# Patient Record
Sex: MALE | Race: WHITE | Employment: FULL TIME | URBAN - METROPOLITAN AREA
[De-identification: names, ages, dates, MRNs, and addresses within clinical notes are randomized per-mention and may not be internally consistent; named-entity substitution may affect disease eponyms.]

---

## 2021-05-25 ENCOUNTER — OFFICE VISIT (OUTPATIENT)
Dept: ORTHOPEDIC SURGERY | Age: 66
End: 2021-05-25
Payer: COMMERCIAL

## 2021-05-25 VITALS
BODY MASS INDEX: 40.09 KG/M2 | WEIGHT: 280 LBS | DIASTOLIC BLOOD PRESSURE: 61 MMHG | SYSTOLIC BLOOD PRESSURE: 98 MMHG | HEART RATE: 68 BPM | HEIGHT: 70 IN

## 2021-05-25 DIAGNOSIS — M25.551 RIGHT HIP PAIN: Primary | ICD-10-CM

## 2021-05-25 PROCEDURE — 99203 OFFICE O/P NEW LOW 30 MIN: CPT | Performed by: ORTHOPAEDIC SURGERY

## 2021-05-25 RX ORDER — ACYCLOVIR 200 MG/1
200 CAPSULE ORAL SEE ADMIN INSTRUCTIONS
COMMUNITY
Start: 2021-05-19

## 2021-05-25 RX ORDER — ATORVASTATIN CALCIUM 80 MG/1
80 TABLET, FILM COATED ORAL DAILY
COMMUNITY
Start: 2021-05-19

## 2021-05-25 RX ORDER — LISINOPRIL AND HYDROCHLOROTHIAZIDE 20; 12.5 MG/1; MG/1
1 TABLET ORAL DAILY
COMMUNITY
Start: 2021-05-19

## 2021-05-25 RX ORDER — VITAMIN A, VITAMIN C, VITAMIN D-3, VITAMIN E, VITAMIN B-1, VITAMIN B-2, NIACIN, VITAMIN B-6, CALCIUM, IRON, ZINC, COPPER 4000; 120; 400; 22; 1.84; 3; 20; 10; 1; 12; 200; 27; 25; 2 [IU]/1; MG/1; [IU]/1; MG/1; MG/1; MG/1; MG/1; MG/1; MG/1; UG/1; MG/1; MG/1; MG/1; MG/1
1 TABLET ORAL 2 TIMES DAILY
Qty: 60 TABLET | Refills: 0 | Status: SHIPPED | OUTPATIENT
Start: 2021-05-25 | End: 2021-06-22

## 2021-05-25 RX ORDER — MELOXICAM 15 MG/1
15 TABLET ORAL DAILY
COMMUNITY
Start: 2021-05-19

## 2021-05-25 NOTE — PROGRESS NOTES
Patient Name: Rayburn Severs  : 1955  DOS: 2021        Chief Complaint:   Chief Complaint   Patient presents with    Hip Pain     RIGHT HIP-NO RECENT INJURY, ONGOING PAIN       History of Present Illness:  Rayburn Severs is a 73 y.o. male who presents with a chief complaint of  Right hip pain. Bilateral total knee arthroplasty doing well. Right hip with conitnued irritation and progressive difficulty with movemnet and use. Medical History  Current Medications:   Prior to Admission medications    Medication Sig Start Date End Date Taking? Authorizing Provider   acyclovir (ZOVIRAX) 200 MG capsule Take 200 mg by mouth See Admin Instructions 21  Yes Historical Provider, MD   atorvastatin (LIPITOR) 80 MG tablet Take 80 mg by mouth daily 21  Yes Historical Provider, MD   lisinopril-hydroCHLOROthiazide (PRINZIDE;ZESTORETIC) 20-12.5 MG per tablet Take 1 tablet by mouth daily 21  Yes Historical Provider, MD   meloxicam (MOBIC) 15 MG tablet Take 15 mg by mouth daily 21  Yes Historical Provider, MD   Multiple Vitamins-Minerals (MULTIVITAMIN ADULTS PO) Take 1 tablet by mouth daily    Historical Provider, MD   Aspirin Buf,CaCarb-MgCarb-MgO, 81 MG TABS Take 81 mg by mouth daily    Historical Provider, MD     Allergies: No Known Allergies  No past medical history on file.   Social History     Socioeconomic History    Marital status:      Spouse name: Not on file    Number of children: Not on file    Years of education: Not on file    Highest education level: Not on file   Occupational History    Not on file   Tobacco Use    Smoking status: Former Smoker     Types: Cigarettes    Smokeless tobacco: Former User   Substance and Sexual Activity    Alcohol use: Not on file    Drug use: Not on file    Sexual activity: Not on file   Other Topics Concern    Not on file   Social History Narrative    Not on file     Social Determinants of Health     Financial Resource Strain:     Difficulty of Paying Living Expenses:    Food Insecurity:     Worried About Running Out of Food in the Last Year:     [address] in the Last Year:    Transportation Needs:     Lack of Transportation (Medical):  Lack of Transportation (Non-Medical):    Physical Activity:     Days of Exercise per Week:     Minutes of Exercise per Session:    Stress:     Feeling of Stress :    Social Connections:     Frequency of Communication with Friends and Family:     Frequency of Social Gatherings with Friends and Family:     Attends [employer] Services:     Active Member of Clubs or Organizations:     Attends Club or Organization Meetings:     Marital Status:    Intimate Partner Violence:     Fear of Current or Ex-Partner:     Emotionally Abused:     Physically Abused:     Sexually Abused:      No family history on file. Review of Systems:   Review of Systems   Constitutional: Negative for chills and fever. HENT: Negative for nosebleeds. Eyes: Negative for double vision. Cardiovascular: Negative for chest pain. Gastrointestinal: Negative for abdominal pain. Musculoskeletal: Positive for joint pain and myalgias. Skin: Negative for rash. Neurological: Negative for seizures. Psychiatric/Behavioral: Negative for hallucinations. All other systems reviewed and negative     Assessment   Vital Signs:   BP 98/61   Pulse 68   Ht 5' 10\" (1.778 m)   Wt 280 lb (127 kg)   BMI 40.18 kg/m²     Physical Exam   Constitutional: Patient is oriented to person, place, and time and well-developed, well-nourished, and in no distress. HENT:   Head: Normocephalic and atraumatic. Eyes: Pupils are equal, round, and reactive to light. Neck: No tracheal deviation present. No thyromegaly present. Pulmonary/Chest: Effort normal.   Abdominal: Soft. There is no guarding. Musculoskeletal: Patient exhibits tenderness and pain.    Neurological: Patient is alert and oriented to person, place, and time. Skin: Skin is warm. Psychiatric: Affect normal.       Right Hip Examination    Inspection: There is no swelling or ecchymosis. There is no obvious deformity. Palpation: There is tenderness over the greater trochanter. There is anterior groin tenderness. Range of Motion: [id number] ER50  IR10    Strength: Hip flexors rated: 4/5. Special Tests: Trendelenberg sign: positive       Skin: There are no rashes, ulcerations or lesions. Gait: Patient walks with a limp. Skin shows no rashes/ecchymosis to the affected area, no hyperesthesias, no discoloration, no temperature or color discrepancies. NEUROLOGICALLY: There is no evidence for sensory or motor deficits in the extremity. Coordination appears full with no spacticity or rigidity. Reflexes appear to be symmetric. Distal circulation intact. No signs of RSD. Additional Comments:     Diagnostic Test Findings:   xrays of the right hip with noted medial wear and osteophytes throughout the right hip femoral head. Procedure(s): none. Assessment and Plan:  1. Right hip pain        No follow-ups on file. The orders below, if any, were placed during this visit:   Orders Placed This Encounter   Procedures    XR HIP 2-3 VW W PELVIS RIGHT     Standing Status:   Future     Number of Occurrences:   1     Standing Expiration Date:   6/25/2021     Order Specific Question:   Reason for exam:     Answer:   Pain       Impression:   [unfilled]    Treatment Plan: Because he has such persistent pain and poor function currently, he does not wish to continue living in this way. Total Hip Arthroplasty has been recommended. All potential risks, the duration of hospitalization and rehab have been discussed. Surgery will be scheduled after medical clearance has been received.            Gretel Mccain MD

## 2021-06-16 ENCOUNTER — TELEPHONE (OUTPATIENT)
Dept: ORTHOPEDIC SURGERY | Age: 66
End: 2021-06-16

## 2021-06-16 NOTE — PROGRESS NOTES
[address] [facility] patients having surgery or anesthesia are required to be Covid tested OR to have been vaccinated at least 14 days prior to your procedure. It is very important to return our call to [phone] to notify the staff of your last vaccination date or you will be required to complete Covid testing within the 5-6 days prior to surgery & quarantine. We recommend coming to the [facility]. flu tent to complete. It is open Monday-Friday 8am-3pm currently. If you go outside [facility] you need to ensure you have a PCR Covid test and fax results to PreAdmission Testing at [phone]. PRIOR TO PROCEDURE DATE:        1. PLEASE FOLLOW ANY  GUIDELINES/ INSTRUCTIONS PRIOR TO YOUR PROCEDURE AS ADVISED BY YOUR SURGEON. 2. Arrange for someone to drive you home and be with you for the first 24 hours after discharge for your safety after your procedure for which you received sedation. Ensure it is someone we can share information with regarding your discharge. 3. You must contact your surgeon for instructions IF:   You are taking any blood thinners, aspirin, anti-inflammatory or vitamin E.   There is a change in your physical condition such as a cold, fever, rash, cuts, sores or any other infection, especially near your surgical site. 4. Do not drink alcohol the day before or day of your procedure. 5. A Pre-op History and Physical for surgery MUST be completed by your Physician or Urgent Care within 30 days of your procedure date. Please bring a copy with you on the day of your procedure and along with any other testing performed. THE DAY OF YOUR PROCEDURE:  1. Follow instructions for ARRIVAL TIME as DIRECTED BY YOUR SURGEON. 2. Enter the MAIN entrance from [employer] and follow the signs to the free [employer] or [employer] parking (offered free of charge 6am-5pm).             3. Enter the Main Entrance of the hospital (do not enter from the lower level of the parking garage). Upon entrance, check in with the  at the main desk on your left. If no one is available at the desk, proceed into the [facility] Waiting Room and go through the door directly into the [facility]. There is a Check-in desk ACROSS from Room 5 (marked with a sign hanging from the ceiling). The phone number for the surgery center is [phone]. 4. Please call [phone] option #2 option #2 if you have not been preregistered yet. On the day of your procedure bring your insurance card and photo ID. You will be registered at your bedside once brought back to your room. 5. DO NOT EAT ANYTHING eight hours prior to your arrival for surgery. May have 8 ounces of water 4 hours prior to your arrival for surgery. NOTE: ALL Gastric, Bariatric and Bowel surgery patients MUST follow their surgeon's instructions. 6. MEDICATIONS    Take the following medications with a SMALL sip of water:    Use your usual dose of inhalers the morning of surgery. BRING your rescue inhaler with you to hospital.    Anesthesia does NOT want you to take insulin the morning of surgery. They will control your blood sugar while you are at the hospital. Please contact your ordering physician for instructions regarding your insulin the night before your procedure. If you have an insulin pump, please keep it set on basal rate. 7. Do not swallow water when brushing teeth. No gum, candy, mints or ice chips. Refrain from smoking or at least decrease the amount. 8. Dress in loose, comfortable clothing appropriate for redressing after your procedure. Do not wear jewelry (including body piercings), make-up (especially NO eye make-up), fingernail polish (NO toenail polish if foot/leg surgery), lotion, powders or metal hairclips. 9. Dentures, glasses, or contacts will need to be removed before your procedure.  Bring cases for your glasses, contacts, dentures, or hearing aids to protect them while you are in surgery. 10. If you use a CPAP, please bring it with you on the day of your procedure. 11. We recommend that valuable personal  belongings such as cash, cell phones, e-tablets or jewelry, be left at home during your stay. The hospital will not be responsible for valuables that are not secured in the hospital safe. However, if your insurance requires a co-pay, you may want to bring a method of payment, i.e. Check or credit card, if you wish to pay your co-pay the day of surgery. 12. If you are to stay overnight, you may bring a bag with personal items. Please have any large items you may need brought in by your family after your arrival to your hospital room. 15. If you have a Living Will or Durable Power of , please bring a copy on the day of your procedure. 15. With your permission, one family member may accompany you while you are being prepared for surgery. Once you are ready, additional family members may join you. HOW WE KEEP YOU SAFE and WORK TO PREVENT SURGICAL SITE INFECTIONS:  1. Health care workers should always check your ID bracelet to verify your name and birth date. You will be asked many times to state your name, date of birth, and allergies. 2. Health care workers should always clean their hands with soap or alcohol gel before providing care to you. It is okay to ask anyone if they cleaned their hands before they touch you. 3. You will be actively involved in verifying the type of procedure you are having and ensuring the correct surgical site. This will be confirmed multiple times prior to your procedure. Do NOT meng your surgery site UNLESS instructed to by your surgeon. 4. Do not shave or wax for 72 hours prior to procedure near your operative site. Shaving with a razor can irritate your skin and make it easier to develop an infection.  On the day of your procedure, any hair that needs to be removed near the surgical site will be clipped by a healthcare worker using a special clippers designed to avoid skin irritation. 5. When you are in the operating room, your surgical site will be cleansed with a special soap, and in most cases, you will be given an antibiotic before the surgery begins. What to expect AFTER YOUR PROCEDURE:  1. Immediately following your procedure, your will be taken to the PACU for the first phase of your recovery. Your nurse will help you recover from any potential side effects of anesthesia, such as extreme drowsiness, changes in your vital signs or breathing patterns. Nausea, headache, muscle aches, or sore throat may also occur after anesthesia. Your nurse will help you manage these potential side effects. 2. For comfort and safety, arrange to have someone at home with you for the first 24 hours after discharge. 3. You and your family will be given written instructions about your diet, activity, dressing care, medications, and return visits. 4. Once at home, should issues with nausea, pain, or bleeding occur, or should you notice any signs of infection, you should call your surgeon. 5. Always clean your hands before and after caring for your wound. Do not let your family touch your surgery site without cleaning their hands. 6. Narcotic pain medications can cause significant constipation. You may want to add a stool softener to your postoperative medication schedule or speak to your surgeon on how best to manage this SIDE EFFECT. SPECIAL INSTRUCTIONS     Thank you for allowing us to care for you. We strive to exceed your expectations in the delivery of care and service provided to you and your family. If you need to contact the [place] [address] staff for any reason, please call us at [phone]    Instructions reviewed with patient during preadmission testing phone interview.   Ivon Duarte RN.6/16/2021 .1:11 PM      ADDITIONAL EDUCATIONAL INFORMATION REVIEWED PER PHONE WITH YOU AND/OR YOUR FAMILY:  Yes Hibiclens® Bathing Instructions

## 2021-06-17 ENCOUNTER — TELEPHONE (OUTPATIENT)
Dept: ORTHOPEDIC SURGERY | Age: 66
End: 2021-06-17

## 2021-06-17 NOTE — TELEPHONE ENCOUNTER
Pt lives in [address] [place] [address] Avenue did not speak with pt prior to surgery. COVID:    Orthopedic Nurse Navigator Summary  -  Patient Name: Karthikeyan Holt  Anticipated Date of Surgery:6/21/21  Using [employer]? Yes, Are they Registered: Yes  Attended Pre-Op Education Class: No  If No, why not? PCP:  Phone #:  Date of PCP Visit for H&P: 06/02/2021  Any Noted Concerns from PCP prior to surgery: No  If Yes, what concerns?:  Is the Patient in a Pain Management Program?: No  Review of Past Medical History Reveals History of:  -  Critical Lab Values:  - Hemoglobin (g/dL): Date Value 15.8  - Hematocrit (%): Date Value  - HgbA1C : Date Value 6.0  - Albumin : Date Value 4.7  - BUN (mg/dL) : Date Value 22.0  - CREATININE (mg/dL) : Date Value 1.05  - BMI (kg/m2) : Date Value  -  Coronary Artery Disease/HTN/CHF History: Yes  Cardiologist: CAD, HTN  Cardiac Clearance Necessary: No  Date of Cardiac Clearance Appt: On Plavix? No  If YES, when will they stop taking? Final Cardiac Recommendations: On any anticoagulation: No  -  Diabetes History: Yes  Most Recent HgbA1C: 6.0  PCP or Endocrine Recommendations:  Nutritionist/Dietician Consult Scheduled:  Final Plan For Diabetic Control:  Pulmonary: COPD/Emphysema/ Use of home oxygen:  Alcohol use: Non-Drinker  -  DVT Risk Stratification: Low Risk  Vascular Consult Ordered:  Date of Vascular Appt:  Hematology/Oncology Consult Ordered:  Date of Hematology/Oncology Appt:  Final Recommendation For DVT Prophylaxis:  Smoking history: Non-Smoker  Use of Estrogen:  -  BMI Greater than 40 at time of scheduling?: No  Has Surgeon been notified of BMI concern?  No  Weight Loss Clinic Consult Ordered No  Date of Wt Loss Clinic Appt:  BMI at time of surgery (if went through [facility]):  [facility]  06/17/2021

## 2021-06-18 ENCOUNTER — TELEPHONE (OUTPATIENT)
Dept: ORTHOPEDIC SURGERY | Age: 66
End: 2021-06-18

## 2021-06-18 NOTE — PROGRESS NOTES
[facility] [employer] [employer] / [address], [address]    Acknowledgment of Informed Consent for Surgical or Medical Procedure and Sedation  I agree to allow doctor(s) Maurizio Puente and his/her associates or assistants, including residents and/or other qualified medical practitioner to perform the following medical treatment or procedure and to administer or direct the administration of sedation as necessary:  Procedure(s):  RIGHT TOTAL HIP ARTHROPLASTY ANTERIOR    My doctor has explained the following regarding the proposed procedure:   the explanation of the procedure   the benefits of the procedure   the potential problems that might occur during recuperation   the risks and side effects of the procedure which could include but are not limited to severe blood loss, infection, stroke or death   the benefits, risks and side effect of alternative procedures including the consequences of declining this procedure or any alternative procedures   the likelihood of achieving satisfactory results. I acknowledge no guarantee or assurance has been made to me regarding the results. I understand that during the course of this treatment/procedure, unforeseen conditions can occur which require an additional or different procedure. I agree to allow my physician or assistants to perform such extension of the original procedure as they may find necessary. I understand that sedation will often result in temporary impairment of memory and fine motor skills and that sedation can occasionally progress to a state of deep sedation or general anesthesia. I understand the risks of anesthesia for surgery include, but are not limited to, sore throat, hoarseness, injury to face, mouth, or teeth; nausea; headache; injury to blood vessels or nerves; death, brain damage, or paralysis.     I understand that if I have a Limitation of Treatment order in effect during my hospitalization, the order

## 2021-06-22 RX ORDER — PRENATAL WITH FERROUS FUM AND FOLIC ACID 3080; 920; 120; 400; 22; 1.84; 3; 20; 10; 1; 12; 200; 27; 25; 2 [IU]/1; [IU]/1; MG/1; [IU]/1; MG/1; MG/1; MG/1; MG/1; MG/1; MG/1; UG/1; MG/1; MG/1; MG/1; MG/1
TABLET ORAL
Qty: 60 TABLET | Refills: 0 | Status: SHIPPED | OUTPATIENT
Start: 2021-06-22

## 2021-06-29 ENCOUNTER — ANESTHESIA EVENT (OUTPATIENT)
Dept: OPERATING ROOM | Age: 66
End: 2021-06-29
Payer: COMMERCIAL

## 2021-06-29 NOTE — PROGRESS NOTES
[facility] [employer] [employer] / [facility] [address] [facility], [address]    Acknowledgment of Informed Consent for Surgical or Medical Procedure and Sedation  I agree to allow doctor(s) HAL VO and his/her associates or assistants, including residents and/or other qualified medical practitioner to perform the following medical treatment or procedure and to administer or direct the administration of sedation as necessary:  Procedure(s): RIGHT TOTAL HIP ARTHROPLASTY ANTERIOR   My doctor has explained the following regarding the proposed procedure:   the explanation of the procedure   the benefits of the procedure   the potential problems that might occur during recuperation   the risks and side effects of the procedure which could include but are not limited to severe blood loss, infection, stroke or death   the benefits, risks and side effect of alternative procedures including the consequences of declining this procedure or any alternative procedures   the likelihood of achieving satisfactory results. I acknowledge no guarantee or assurance has been made to me regarding the results. I understand that during the course of this treatment/procedure, unforeseen conditions can occur which require an additional or different procedure. I agree to allow my physician or assistants to perform such extension of the original procedure as they may find necessary. I understand that sedation will often result in temporary impairment of memory and fine motor skills and that sedation can occasionally progress to a state of deep sedation or general anesthesia. I understand the risks of anesthesia for surgery include, but are not limited to, sore throat, hoarseness, injury to face, mouth, or teeth; nausea; headache; injury to blood vessels or nerves; death, brain damage, or paralysis.     I understand that if I have a Limitation of Treatment order in effect during my hospitalization, the order may or may not be in effect during this procedure. I give my doctor permission to give me blood or blood products. I understand that there are risks with receiving blood such as hepatitis, AIDS, fever, or allergic reaction. I acknowledge that the risks, benefits, and alternatives of this treatment have been explained to me and that no express or implied warranty has been given by the hospital, any blood bank, or any person or entity as to the blood or blood components transfused. At the discretion of my doctor, I agree to allow observers, equipment/product representatives and allow photographing, and/or televising of the procedure, provided my name or identity is maintained confidentially. I agree the hospital may dispose of or use for scientific or educational purposes any tissue, fluid, or body parts which may be removed.     ________________________________Date________Time______ am/pm  (Friona One)  Patient or Signature of Closest Relative or Legal Guardian    ________________________________Date________Time______am/pm      Page 1 of  1  Witness

## 2021-06-30 ENCOUNTER — HOSPITAL ENCOUNTER (OUTPATIENT)
Age: 66
Setting detail: OBSERVATION
Discharge: HOME | End: 2021-07-01
Attending: ORTHOPAEDIC SURGERY | Admitting: ORTHOPAEDIC SURGERY
Payer: COMMERCIAL

## 2021-06-30 ENCOUNTER — APPOINTMENT (OUTPATIENT)
Dept: GENERAL RADIOLOGY | Age: 66
End: 2021-06-30
Attending: ORTHOPAEDIC SURGERY
Payer: COMMERCIAL

## 2021-06-30 ENCOUNTER — ANESTHESIA (OUTPATIENT)
Dept: OPERATING ROOM | Age: 66
End: 2021-06-30
Payer: COMMERCIAL

## 2021-06-30 VITALS — SYSTOLIC BLOOD PRESSURE: 131 MMHG | OXYGEN SATURATION: 94 % | TEMPERATURE: 95.9 F | DIASTOLIC BLOOD PRESSURE: 75 MMHG

## 2021-06-30 DIAGNOSIS — Z96.641 STATUS POST TOTAL HIP REPLACEMENT, RIGHT: Primary | ICD-10-CM

## 2021-06-30 DIAGNOSIS — M16.11 OSTEOARTHRITIS OF RIGHT HIP, UNSPECIFIED OSTEOARTHRITIS TYPE: ICD-10-CM

## 2021-06-30 LAB
A/G RATIO: 1.6 (ref 1.1–2.2)
ABO/RH: NORMAL
ALBUMIN SERPL-MCNC: 4.4 G/DL (ref 3.4–5)
ALP BLD-CCNC: 179 U/L (ref 40–129)
ALT SERPL-CCNC: 40 U/L (ref 10–40)
ANION GAP SERPL CALCULATED.3IONS-SCNC: 10 MMOL/L (ref 3–16)
ANTIBODY SCREEN: NORMAL
APTT: 29.8 SEC (ref 24.2–36.2)
AST SERPL-CCNC: 32 U/L (ref 15–37)
BILIRUB SERPL-MCNC: 0.8 MG/DL (ref 0–1)
BUN BLDV-MCNC: 20 MG/DL (ref 7–20)
CALCIUM SERPL-MCNC: 9.8 MG/DL (ref 8.3–10.6)
CHLORIDE BLD-SCNC: 105 MMOL/L (ref 99–110)
CO2: 24 MMOL/L (ref 21–32)
CREAT SERPL-MCNC: 0.8 MG/DL (ref 0.8–1.3)
GFR AFRICAN AMERICAN: >60
GFR NON-AFRICAN AMERICAN: >60
GLOBULIN: 2.8 G/DL
GLUCOSE BLD-MCNC: 124 MG/DL (ref 70–99)
HCT VFR BLD CALC: 37.6 % (ref 40.5–52.5)
HEMOGLOBIN: 12.5 G/DL (ref 13.5–17.5)
INR BLD: 1.03 (ref 0.86–1.14)
POTASSIUM SERPL-SCNC: 4 MMOL/L (ref 3.5–5.1)
PROTHROMBIN TIME: 12 SEC (ref 10–13.2)
SODIUM BLD-SCNC: 139 MMOL/L (ref 136–145)
TOTAL PROTEIN: 7.2 G/DL (ref 6.4–8.2)

## 2021-06-30 PROCEDURE — 27130 TOTAL HIP ARTHROPLASTY: CPT | Performed by: ORTHOPAEDIC SURGERY

## 2021-06-30 PROCEDURE — 2580000003 HC RX 258: Performed by: PHYSICIAN ASSISTANT

## 2021-06-30 PROCEDURE — 86901 BLOOD TYPING SEROLOGIC RH(D): CPT

## 2021-06-30 PROCEDURE — 73502 X-RAY EXAM HIP UNI 2-3 VIEWS: CPT

## 2021-06-30 PROCEDURE — 6360000002 HC RX W HCPCS: Performed by: ANESTHESIOLOGY

## 2021-06-30 PROCEDURE — C1776 JOINT DEVICE (IMPLANTABLE): HCPCS | Performed by: ORTHOPAEDIC SURGERY

## 2021-06-30 PROCEDURE — 2720000010 HC SURG SUPPLY STERILE: Performed by: ORTHOPAEDIC SURGERY

## 2021-06-30 PROCEDURE — 85610 PROTHROMBIN TIME: CPT

## 2021-06-30 PROCEDURE — 86850 RBC ANTIBODY SCREEN: CPT

## 2021-06-30 PROCEDURE — 2580000003 HC RX 258: Performed by: ANESTHESIOLOGY

## 2021-06-30 PROCEDURE — 6360000002 HC RX W HCPCS: Performed by: ORTHOPAEDIC SURGERY

## 2021-06-30 PROCEDURE — 3700000001 HC ADD 15 MINUTES (ANESTHESIA): Performed by: ORTHOPAEDIC SURGERY

## 2021-06-30 PROCEDURE — 80053 COMPREHEN METABOLIC PANEL: CPT

## 2021-06-30 PROCEDURE — 3600000005 HC SURGERY LEVEL 5 BASE: Performed by: ORTHOPAEDIC SURGERY

## 2021-06-30 PROCEDURE — 3600000015 HC SURGERY LEVEL 5 ADDTL 15MIN: Performed by: ORTHOPAEDIC SURGERY

## 2021-06-30 PROCEDURE — 83036 HEMOGLOBIN GLYCOSYLATED A1C: CPT

## 2021-06-30 PROCEDURE — 85014 HEMATOCRIT: CPT

## 2021-06-30 PROCEDURE — 7100000001 HC PACU RECOVERY - ADDTL 15 MIN: Performed by: ORTHOPAEDIC SURGERY

## 2021-06-30 PROCEDURE — 3209999900 FLUORO FOR SURGICAL PROCEDURES

## 2021-06-30 PROCEDURE — 2500000003 HC RX 250 WO HCPCS: Performed by: NURSE ANESTHETIST, CERTIFIED REGISTERED

## 2021-06-30 PROCEDURE — 86900 BLOOD TYPING SEROLOGIC ABO: CPT

## 2021-06-30 PROCEDURE — 7100000000 HC PACU RECOVERY - FIRST 15 MIN: Performed by: ORTHOPAEDIC SURGERY

## 2021-06-30 PROCEDURE — 88311 DECALCIFY TISSUE: CPT

## 2021-06-30 PROCEDURE — P9041 ALBUMIN (HUMAN),5%, 50ML: HCPCS | Performed by: NURSE ANESTHETIST, CERTIFIED REGISTERED

## 2021-06-30 PROCEDURE — 2580000003 HC RX 258: Performed by: ORTHOPAEDIC SURGERY

## 2021-06-30 PROCEDURE — 85018 HEMOGLOBIN: CPT

## 2021-06-30 PROCEDURE — 3700000000 HC ANESTHESIA ATTENDED CARE: Performed by: ORTHOPAEDIC SURGERY

## 2021-06-30 PROCEDURE — 2709999900 HC NON-CHARGEABLE SUPPLY: Performed by: ORTHOPAEDIC SURGERY

## 2021-06-30 PROCEDURE — 51798 US URINE CAPACITY MEASURE: CPT

## 2021-06-30 PROCEDURE — 6370000000 HC RX 637 (ALT 250 FOR IP): Performed by: ANESTHESIOLOGY

## 2021-06-30 PROCEDURE — 88304 TISSUE EXAM BY PATHOLOGIST: CPT

## 2021-06-30 PROCEDURE — 85730 THROMBOPLASTIN TIME PARTIAL: CPT

## 2021-06-30 PROCEDURE — 2500000003 HC RX 250 WO HCPCS: Performed by: ORTHOPAEDIC SURGERY

## 2021-06-30 PROCEDURE — G0378 HOSPITAL OBSERVATION PER HR: HCPCS

## 2021-06-30 PROCEDURE — 6360000002 HC RX W HCPCS: Performed by: NURSE ANESTHETIST, CERTIFIED REGISTERED

## 2021-06-30 PROCEDURE — 6370000000 HC RX 637 (ALT 250 FOR IP): Performed by: PHYSICIAN ASSISTANT

## 2021-06-30 PROCEDURE — 6360000002 HC RX W HCPCS: Performed by: PHYSICIAN ASSISTANT

## 2021-06-30 PROCEDURE — 6370000000 HC RX 637 (ALT 250 FOR IP): Performed by: ORTHOPAEDIC SURGERY

## 2021-06-30 DEVICE — R3 3 HOLE ACETABULAR SHELL 56MM
Type: IMPLANTABLE DEVICE | Site: HIP | Status: FUNCTIONAL
Brand: R3 ACETABULAR

## 2021-06-30 DEVICE — ANTHOLOGY HIGH OFFSET POROUS PLUS                                    HA SIZE 7
Type: IMPLANTABLE DEVICE | Site: HIP | Status: FUNCTIONAL
Brand: ANTHOLOGY

## 2021-06-30 DEVICE — TITANIUM MODULAR HEAD SLEEVE 12/14                                    TAPER +4: Type: IMPLANTABLE DEVICE | Site: HIP | Status: FUNCTIONAL

## 2021-06-30 DEVICE — 40MM OXINIUM MODULAR FEMORAL HEAD
Type: IMPLANTABLE DEVICE | Site: HIP | Status: FUNCTIONAL
Brand: OXINIUM

## 2021-06-30 DEVICE — REFLECTION SPHERICAL HEAD SCREW 25MM
Type: IMPLANTABLE DEVICE | Site: HIP | Status: FUNCTIONAL
Brand: REFLECTION

## 2021-06-30 DEVICE — R3 20 DEGREE XLPE ACETABULAR LINER                                    40MM INNER DIAMETER X 56MM OUTER DIAMETER
Type: IMPLANTABLE DEVICE | Site: HIP | Status: FUNCTIONAL
Brand: R3

## 2021-06-30 DEVICE — REFLECTION SPHERICAL HEAD SCREW 35MM
Type: IMPLANTABLE DEVICE | Site: HIP | Status: FUNCTIONAL
Brand: REFLECTION

## 2021-06-30 DEVICE — HIP H2 TOT ADV OTHER HD IMPL CAPPED H2 SN: Type: IMPLANTABLE DEVICE | Site: HIP | Status: FUNCTIONAL

## 2021-06-30 RX ORDER — ROCURONIUM BROMIDE 10 MG/ML
INJECTION, SOLUTION INTRAVENOUS PRN
Status: DISCONTINUED | OUTPATIENT
Start: 2021-06-30 | End: 2021-06-30 | Stop reason: SDUPTHER

## 2021-06-30 RX ORDER — M-VIT,TX,IRON,MINS/CALC/FOLIC 27MG-0.4MG
TABLET ORAL DAILY
Status: DISCONTINUED | OUTPATIENT
Start: 2021-06-30 | End: 2021-07-01 | Stop reason: HOSPADM

## 2021-06-30 RX ORDER — ONDANSETRON 2 MG/ML
4 INJECTION INTRAMUSCULAR; INTRAVENOUS
Status: DISCONTINUED | OUTPATIENT
Start: 2021-06-30 | End: 2021-06-30 | Stop reason: HOSPADM

## 2021-06-30 RX ORDER — SODIUM CHLORIDE 450 MG/100ML
INJECTION, SOLUTION INTRAVENOUS CONTINUOUS
Status: DISCONTINUED | OUTPATIENT
Start: 2021-06-30 | End: 2021-07-01 | Stop reason: HOSPADM

## 2021-06-30 RX ORDER — EPHEDRINE SULFATE 50 MG/ML
INJECTION INTRAVENOUS PRN
Status: DISCONTINUED | OUTPATIENT
Start: 2021-06-30 | End: 2021-06-30 | Stop reason: SDUPTHER

## 2021-06-30 RX ORDER — ACETAMINOPHEN 325 MG/1
650 TABLET ORAL EVERY 6 HOURS
Status: DISCONTINUED | OUTPATIENT
Start: 2021-06-30 | End: 2021-07-01 | Stop reason: HOSPADM

## 2021-06-30 RX ORDER — MIDAZOLAM HYDROCHLORIDE 1 MG/ML
INJECTION INTRAMUSCULAR; INTRAVENOUS PRN
Status: DISCONTINUED | OUTPATIENT
Start: 2021-06-30 | End: 2021-06-30 | Stop reason: SDUPTHER

## 2021-06-30 RX ORDER — PROCHLORPERAZINE EDISYLATE 5 MG/ML
5 INJECTION INTRAMUSCULAR; INTRAVENOUS
Status: DISCONTINUED | OUTPATIENT
Start: 2021-06-30 | End: 2021-06-30 | Stop reason: HOSPADM

## 2021-06-30 RX ORDER — ALBUMIN, HUMAN INJ 5% 5 %
SOLUTION INTRAVENOUS PRN
Status: DISCONTINUED | OUTPATIENT
Start: 2021-06-30 | End: 2021-06-30 | Stop reason: SDUPTHER

## 2021-06-30 RX ORDER — ONDANSETRON 2 MG/ML
4 INJECTION INTRAMUSCULAR; INTRAVENOUS EVERY 6 HOURS PRN
Status: DISCONTINUED | OUTPATIENT
Start: 2021-06-30 | End: 2021-07-01 | Stop reason: HOSPADM

## 2021-06-30 RX ORDER — ONDANSETRON 2 MG/ML
INJECTION INTRAMUSCULAR; INTRAVENOUS PRN
Status: DISCONTINUED | OUTPATIENT
Start: 2021-06-30 | End: 2021-06-30 | Stop reason: SDUPTHER

## 2021-06-30 RX ORDER — VANCOMYCIN HYDROCHLORIDE 1 G/20ML
INJECTION, POWDER, LYOPHILIZED, FOR SOLUTION INTRAVENOUS PRN
Status: DISCONTINUED | OUTPATIENT
Start: 2021-06-30 | End: 2021-06-30 | Stop reason: ALTCHOICE

## 2021-06-30 RX ORDER — HYDROCODONE BITARTRATE AND ACETAMINOPHEN 5; 325 MG/1; MG/1
1 TABLET ORAL
Status: COMPLETED | OUTPATIENT
Start: 2021-06-30 | End: 2021-06-30

## 2021-06-30 RX ORDER — OXYCODONE HCL 10 MG/1
10 TABLET, FILM COATED, EXTENDED RELEASE ORAL ONCE
Status: COMPLETED | OUTPATIENT
Start: 2021-06-30 | End: 2021-06-30

## 2021-06-30 RX ORDER — ACYCLOVIR 200 MG/1
200 CAPSULE ORAL EVERY 4 HOURS PRN
Status: DISCONTINUED | OUTPATIENT
Start: 2021-06-30 | End: 2021-07-01 | Stop reason: HOSPADM

## 2021-06-30 RX ORDER — SODIUM CHLORIDE 9 MG/ML
25 INJECTION, SOLUTION INTRAVENOUS PRN
Status: DISCONTINUED | OUTPATIENT
Start: 2021-06-30 | End: 2021-06-30 | Stop reason: HOSPADM

## 2021-06-30 RX ORDER — MEPERIDINE HYDROCHLORIDE 25 MG/ML
12.5 INJECTION INTRAMUSCULAR; INTRAVENOUS; SUBCUTANEOUS EVERY 5 MIN PRN
Status: DISCONTINUED | OUTPATIENT
Start: 2021-06-30 | End: 2021-06-30 | Stop reason: HOSPADM

## 2021-06-30 RX ORDER — PRENATAL WITH FERROUS FUM AND FOLIC ACID 3080; 920; 120; 400; 22; 1.84; 3; 20; 10; 1; 12; 200; 27; 25; 2 [IU]/1; [IU]/1; MG/1; [IU]/1; MG/1; MG/1; MG/1; MG/1; MG/1; MG/1; UG/1; MG/1; MG/1; MG/1; MG/1
1 TABLET ORAL 2 TIMES DAILY
Status: DISCONTINUED | OUTPATIENT
Start: 2021-06-30 | End: 2021-07-01 | Stop reason: HOSPADM

## 2021-06-30 RX ORDER — CELECOXIB 200 MG/1
400 CAPSULE ORAL ONCE
Status: COMPLETED | OUTPATIENT
Start: 2021-06-30 | End: 2021-06-30

## 2021-06-30 RX ORDER — ACETAMINOPHEN 500 MG
1000 TABLET ORAL ONCE
Status: COMPLETED | OUTPATIENT
Start: 2021-06-30 | End: 2021-06-30

## 2021-06-30 RX ORDER — LISINOPRIL AND HYDROCHLOROTHIAZIDE 20; 12.5 MG/1; MG/1
1 TABLET ORAL DAILY
Status: DISCONTINUED | OUTPATIENT
Start: 2021-07-01 | End: 2021-07-01 | Stop reason: HOSPADM

## 2021-06-30 RX ORDER — SODIUM CHLORIDE 0.9 % (FLUSH) 0.9 %
10 SYRINGE (ML) INJECTION EVERY 12 HOURS SCHEDULED
Status: DISCONTINUED | OUTPATIENT
Start: 2021-06-30 | End: 2021-07-01 | Stop reason: HOSPADM

## 2021-06-30 RX ORDER — DEXAMETHASONE SODIUM PHOSPHATE 10 MG/ML
10 INJECTION, SOLUTION INTRAMUSCULAR; INTRAVENOUS ONCE
Status: COMPLETED | OUTPATIENT
Start: 2021-06-30 | End: 2021-06-30

## 2021-06-30 RX ORDER — PROPOFOL 10 MG/ML
INJECTION, EMULSION INTRAVENOUS PRN
Status: DISCONTINUED | OUTPATIENT
Start: 2021-06-30 | End: 2021-06-30 | Stop reason: SDUPTHER

## 2021-06-30 RX ORDER — LIDOCAINE HCL/PF 100 MG/5ML
SYRINGE (ML) INJECTION PRN
Status: DISCONTINUED | OUTPATIENT
Start: 2021-06-30 | End: 2021-06-30 | Stop reason: SDUPTHER

## 2021-06-30 RX ORDER — OXYCODONE HYDROCHLORIDE 5 MG/1
10 TABLET ORAL EVERY 4 HOURS PRN
Status: DISCONTINUED | OUTPATIENT
Start: 2021-06-30 | End: 2021-07-01 | Stop reason: HOSPADM

## 2021-06-30 RX ORDER — GLYCOPYRROLATE 1 MG/5 ML
SYRINGE (ML) INTRAVENOUS PRN
Status: DISCONTINUED | OUTPATIENT
Start: 2021-06-30 | End: 2021-06-30 | Stop reason: SDUPTHER

## 2021-06-30 RX ORDER — ATORVASTATIN CALCIUM 80 MG/1
80 TABLET, FILM COATED ORAL DAILY
Status: DISCONTINUED | OUTPATIENT
Start: 2021-06-30 | End: 2021-07-01 | Stop reason: HOSPADM

## 2021-06-30 RX ORDER — NEOSTIGMINE METHYLSULFATE 5 MG/5 ML
SYRINGE (ML) INTRAVENOUS PRN
Status: DISCONTINUED | OUTPATIENT
Start: 2021-06-30 | End: 2021-06-30 | Stop reason: SDUPTHER

## 2021-06-30 RX ORDER — 0.9 % SODIUM CHLORIDE 0.9 %
500 INTRAVENOUS SOLUTION INTRAVENOUS
Status: DISCONTINUED | OUTPATIENT
Start: 2021-06-30 | End: 2021-06-30 | Stop reason: HOSPADM

## 2021-06-30 RX ORDER — FENTANYL CITRATE 50 UG/ML
INJECTION, SOLUTION INTRAMUSCULAR; INTRAVENOUS PRN
Status: DISCONTINUED | OUTPATIENT
Start: 2021-06-30 | End: 2021-06-30 | Stop reason: SDUPTHER

## 2021-06-30 RX ORDER — PROMETHAZINE HYDROCHLORIDE 12.5 MG/1
12.5 TABLET ORAL EVERY 6 HOURS PRN
Status: DISCONTINUED | OUTPATIENT
Start: 2021-06-30 | End: 2021-07-01 | Stop reason: HOSPADM

## 2021-06-30 RX ORDER — SODIUM CHLORIDE 0.9 % (FLUSH) 0.9 %
10 SYRINGE (ML) INJECTION PRN
Status: DISCONTINUED | OUTPATIENT
Start: 2021-06-30 | End: 2021-07-01 | Stop reason: HOSPADM

## 2021-06-30 RX ORDER — DIPHENHYDRAMINE HYDROCHLORIDE 50 MG/ML
12.5 INJECTION INTRAMUSCULAR; INTRAVENOUS
Status: DISCONTINUED | OUTPATIENT
Start: 2021-06-30 | End: 2021-06-30 | Stop reason: HOSPADM

## 2021-06-30 RX ORDER — SODIUM CHLORIDE 9 MG/ML
25 INJECTION, SOLUTION INTRAVENOUS PRN
Status: DISCONTINUED | OUTPATIENT
Start: 2021-06-30 | End: 2021-07-01 | Stop reason: HOSPADM

## 2021-06-30 RX ORDER — SODIUM CHLORIDE, SODIUM LACTATE, POTASSIUM CHLORIDE, CALCIUM CHLORIDE 600; 310; 30; 20 MG/100ML; MG/100ML; MG/100ML; MG/100ML
INJECTION, SOLUTION INTRAVENOUS CONTINUOUS
Status: DISCONTINUED | OUTPATIENT
Start: 2021-06-30 | End: 2021-06-30

## 2021-06-30 RX ORDER — OXYCODONE HYDROCHLORIDE 5 MG/1
5 TABLET ORAL EVERY 4 HOURS PRN
Status: DISCONTINUED | OUTPATIENT
Start: 2021-06-30 | End: 2021-07-01 | Stop reason: HOSPADM

## 2021-06-30 RX ORDER — SENNA AND DOCUSATE SODIUM 50; 8.6 MG/1; MG/1
1 TABLET, FILM COATED ORAL 2 TIMES DAILY
Status: DISCONTINUED | OUTPATIENT
Start: 2021-06-30 | End: 2021-07-01 | Stop reason: HOSPADM

## 2021-06-30 RX ORDER — SODIUM CHLORIDE 0.9 % (FLUSH) 0.9 %
10 SYRINGE (ML) INJECTION EVERY 12 HOURS SCHEDULED
Status: DISCONTINUED | OUTPATIENT
Start: 2021-06-30 | End: 2021-06-30 | Stop reason: HOSPADM

## 2021-06-30 RX ORDER — SODIUM CHLORIDE 0.9 % (FLUSH) 0.9 %
10 SYRINGE (ML) INJECTION PRN
Status: DISCONTINUED | OUTPATIENT
Start: 2021-06-30 | End: 2021-06-30 | Stop reason: HOSPADM

## 2021-06-30 RX ORDER — HYDRALAZINE HYDROCHLORIDE 20 MG/ML
5 INJECTION INTRAMUSCULAR; INTRAVENOUS EVERY 10 MIN PRN
Status: DISCONTINUED | OUTPATIENT
Start: 2021-06-30 | End: 2021-06-30 | Stop reason: HOSPADM

## 2021-06-30 RX ORDER — PREGABALIN 150 MG/1
150 CAPSULE ORAL ONCE
Status: COMPLETED | OUTPATIENT
Start: 2021-06-30 | End: 2021-06-30

## 2021-06-30 RX ADMIN — MIDAZOLAM HYDROCHLORIDE 2 MG: 2 INJECTION, SOLUTION INTRAMUSCULAR; INTRAVENOUS at 11:03

## 2021-06-30 RX ADMIN — PHENYLEPHRINE HYDROCHLORIDE 100 MCG: 10 INJECTION, SOLUTION INTRAMUSCULAR; INTRAVENOUS; SUBCUTANEOUS at 11:22

## 2021-06-30 RX ADMIN — CEFAZOLIN SODIUM 3000 MG: 1 INJECTION, POWDER, FOR SOLUTION INTRAMUSCULAR; INTRAVENOUS at 18:53

## 2021-06-30 RX ADMIN — ROCURONIUM BROMIDE 10 MG: 10 INJECTION INTRAVENOUS at 13:04

## 2021-06-30 RX ADMIN — ONDANSETRON 4 MG: 2 INJECTION INTRAMUSCULAR; INTRAVENOUS at 11:20

## 2021-06-30 RX ADMIN — Medication 0.2 MG: at 11:25

## 2021-06-30 RX ADMIN — ROCURONIUM BROMIDE 50 MG: 10 INJECTION INTRAVENOUS at 11:11

## 2021-06-30 RX ADMIN — EPHEDRINE SULFATE 10 MG: 50 INJECTION INTRAVENOUS at 13:58

## 2021-06-30 RX ADMIN — ROCURONIUM BROMIDE 20 MG: 10 INJECTION INTRAVENOUS at 12:26

## 2021-06-30 RX ADMIN — PRENATAL WITH FERROUS FUM AND FOLIC ACID 1 TABLET: 3080; 920; 120; 400; 22; 1.84; 3; 20; 10; 1; 12; 200; 27; 25; 2 TABLET ORAL at 20:42

## 2021-06-30 RX ADMIN — PHENYLEPHRINE HYDROCHLORIDE 100 MCG: 10 INJECTION, SOLUTION INTRAMUSCULAR; INTRAVENOUS; SUBCUTANEOUS at 11:44

## 2021-06-30 RX ADMIN — PHENYLEPHRINE HYDROCHLORIDE 100 MCG: 10 INJECTION, SOLUTION INTRAMUSCULAR; INTRAVENOUS; SUBCUTANEOUS at 11:25

## 2021-06-30 RX ADMIN — Medication 100 MG: at 11:19

## 2021-06-30 RX ADMIN — EPHEDRINE SULFATE 10 MG: 50 INJECTION INTRAVENOUS at 12:51

## 2021-06-30 RX ADMIN — CEFAZOLIN SODIUM 3000 MG: 10 INJECTION, POWDER, FOR SOLUTION INTRAVENOUS at 11:19

## 2021-06-30 RX ADMIN — Medication 10 ML: at 20:42

## 2021-06-30 RX ADMIN — FENTANYL CITRATE 50 MCG: 50 INJECTION, SOLUTION INTRAMUSCULAR; INTRAVENOUS at 11:38

## 2021-06-30 RX ADMIN — FENTANYL CITRATE 50 MCG: 50 INJECTION, SOLUTION INTRAMUSCULAR; INTRAVENOUS at 13:53

## 2021-06-30 RX ADMIN — FENTANYL CITRATE 50 MCG: 50 INJECTION, SOLUTION INTRAMUSCULAR; INTRAVENOUS at 13:58

## 2021-06-30 RX ADMIN — DEXAMETHASONE SODIUM PHOSPHATE 10 MG: 10 INJECTION, SOLUTION INTRAMUSCULAR; INTRAVENOUS at 10:16

## 2021-06-30 RX ADMIN — SODIUM CHLORIDE, POTASSIUM CHLORIDE, SODIUM LACTATE AND CALCIUM CHLORIDE: 600; 310; 30; 20 INJECTION, SOLUTION INTRAVENOUS at 08:15

## 2021-06-30 RX ADMIN — EPHEDRINE SULFATE 10 MG: 50 INJECTION INTRAVENOUS at 13:02

## 2021-06-30 RX ADMIN — PREGABALIN 150 MG: 150 CAPSULE ORAL at 08:24

## 2021-06-30 RX ADMIN — SODIUM CHLORIDE, POTASSIUM CHLORIDE, SODIUM LACTATE AND CALCIUM CHLORIDE: 600; 310; 30; 20 INJECTION, SOLUTION INTRAVENOUS at 13:40

## 2021-06-30 RX ADMIN — ROCURONIUM BROMIDE 20 MG: 10 INJECTION INTRAVENOUS at 13:09

## 2021-06-30 RX ADMIN — CELECOXIB 400 MG: 200 CAPSULE ORAL at 08:25

## 2021-06-30 RX ADMIN — PHENYLEPHRINE HYDROCHLORIDE 100 MCG: 10 INJECTION, SOLUTION INTRAMUSCULAR; INTRAVENOUS; SUBCUTANEOUS at 11:28

## 2021-06-30 RX ADMIN — FENTANYL CITRATE 50 MCG: 50 INJECTION, SOLUTION INTRAMUSCULAR; INTRAVENOUS at 12:24

## 2021-06-30 RX ADMIN — ASPIRIN 325 MG: 325 TABLET, COATED ORAL at 20:42

## 2021-06-30 RX ADMIN — EPHEDRINE SULFATE 10 MG: 50 INJECTION INTRAVENOUS at 14:04

## 2021-06-30 RX ADMIN — Medication: at 18:32

## 2021-06-30 RX ADMIN — ACETAMINOPHEN 650 MG: 325 TABLET ORAL at 22:57

## 2021-06-30 RX ADMIN — ACETAMINOPHEN 1000 MG: 500 TABLET ORAL at 08:24

## 2021-06-30 RX ADMIN — FENTANYL CITRATE 50 MCG: 50 INJECTION, SOLUTION INTRAMUSCULAR; INTRAVENOUS at 13:47

## 2021-06-30 RX ADMIN — PROPOFOL 150 MG: 10 INJECTION, EMULSION INTRAVENOUS at 11:11

## 2021-06-30 RX ADMIN — TRANEXAMIC ACID 1500 MG: 1 INJECTION, SOLUTION INTRAVENOUS at 11:22

## 2021-06-30 RX ADMIN — HYDROMORPHONE HYDROCHLORIDE 0.5 MG: 1 INJECTION, SOLUTION INTRAMUSCULAR; INTRAVENOUS; SUBCUTANEOUS at 15:00

## 2021-06-30 RX ADMIN — OXYCODONE HYDROCHLORIDE 10 MG: 10 TABLET, FILM COATED, EXTENDED RELEASE ORAL at 08:25

## 2021-06-30 RX ADMIN — ATORVASTATIN CALCIUM 80 MG: 80 TABLET, FILM COATED ORAL at 18:32

## 2021-06-30 RX ADMIN — Medication 5 MG: at 13:47

## 2021-06-30 RX ADMIN — ALBUMIN (HUMAN) 250 ML: 12.5 INJECTION, SOLUTION INTRAVENOUS at 13:23

## 2021-06-30 RX ADMIN — ALBUMIN (HUMAN) 250 ML: 12.5 INJECTION, SOLUTION INTRAVENOUS at 12:49

## 2021-06-30 RX ADMIN — DOCUSATE SODIUM 50 MG AND SENNOSIDES 8.6 MG 1 TABLET: 8.6; 5 TABLET, FILM COATED ORAL at 20:42

## 2021-06-30 RX ADMIN — HYDROCODONE BITARTRATE AND ACETAMINOPHEN 1 TABLET: 5; 325 TABLET ORAL at 16:25

## 2021-06-30 RX ADMIN — SODIUM CHLORIDE: 4.5 INJECTION, SOLUTION INTRAVENOUS at 15:46

## 2021-06-30 RX ADMIN — SODIUM CHLORIDE, POTASSIUM CHLORIDE, SODIUM LACTATE AND CALCIUM CHLORIDE: 600; 310; 30; 20 INJECTION, SOLUTION INTRAVENOUS at 11:49

## 2021-06-30 RX ADMIN — FENTANYL CITRATE 50 MCG: 50 INJECTION, SOLUTION INTRAMUSCULAR; INTRAVENOUS at 11:11

## 2021-06-30 RX ADMIN — Medication 0.8 MG: at 13:47

## 2021-06-30 RX ADMIN — EPHEDRINE SULFATE 10 MG: 50 INJECTION INTRAVENOUS at 12:11

## 2021-06-30 RX ADMIN — HYDROMORPHONE HYDROCHLORIDE 0.5 MG: 1 INJECTION, SOLUTION INTRAMUSCULAR; INTRAVENOUS; SUBCUTANEOUS at 15:16

## 2021-06-30 RX ADMIN — ACETAMINOPHEN 650 MG: 325 TABLET ORAL at 18:32

## 2021-06-30 RX ADMIN — PHENYLEPHRINE HYDROCHLORIDE 100 MCG: 10 INJECTION, SOLUTION INTRAMUSCULAR; INTRAVENOUS; SUBCUTANEOUS at 11:32

## 2021-06-30 ASSESSMENT — PULMONARY FUNCTION TESTS
PIF_VALUE: 22
PIF_VALUE: 16
PIF_VALUE: 28
PIF_VALUE: 26
PIF_VALUE: 23
PIF_VALUE: 5
PIF_VALUE: 24
PIF_VALUE: 6
PIF_VALUE: 16
PIF_VALUE: 1
PIF_VALUE: 26
PIF_VALUE: 22
PIF_VALUE: 6
PIF_VALUE: 87
PIF_VALUE: 25
PIF_VALUE: 23
PIF_VALUE: 22
PIF_VALUE: 25
PIF_VALUE: 23
PIF_VALUE: 25
PIF_VALUE: 15
PIF_VALUE: 25
PIF_VALUE: 24
PIF_VALUE: 24
PIF_VALUE: 5
PIF_VALUE: 24
PIF_VALUE: 22
PIF_VALUE: 23
PIF_VALUE: 23
PIF_VALUE: 15
PIF_VALUE: 6
PIF_VALUE: 23
PIF_VALUE: 25
PIF_VALUE: 22
PIF_VALUE: 5
PIF_VALUE: 22
PIF_VALUE: 5
PIF_VALUE: 22
PIF_VALUE: 22
PIF_VALUE: 5
PIF_VALUE: 25
PIF_VALUE: 24
PIF_VALUE: 23
PIF_VALUE: 24
PIF_VALUE: 24
PIF_VALUE: 22
PIF_VALUE: 24
PIF_VALUE: 21
PIF_VALUE: 22
PIF_VALUE: 6
PIF_VALUE: 5
PIF_VALUE: 24
PIF_VALUE: 26
PIF_VALUE: 5
PIF_VALUE: 23
PIF_VALUE: 6
PIF_VALUE: 6
PIF_VALUE: 22
PIF_VALUE: 24
PIF_VALUE: 22
PIF_VALUE: 22
PIF_VALUE: 16
PIF_VALUE: 0
PIF_VALUE: 5
PIF_VALUE: 22
PIF_VALUE: 23
PIF_VALUE: 22
PIF_VALUE: 16
PIF_VALUE: 22
PIF_VALUE: 24
PIF_VALUE: 22
PIF_VALUE: 25
PIF_VALUE: 22
PIF_VALUE: 26
PIF_VALUE: 22
PIF_VALUE: 24
PIF_VALUE: 21
PIF_VALUE: 22
PIF_VALUE: 6
PIF_VALUE: 22
PIF_VALUE: 26
PIF_VALUE: 23
PIF_VALUE: 16
PIF_VALUE: 0
PIF_VALUE: 22
PIF_VALUE: 5
PIF_VALUE: 5
PIF_VALUE: 6
PIF_VALUE: 7
PIF_VALUE: 22
PIF_VALUE: 26
PIF_VALUE: 24
PIF_VALUE: 16
PIF_VALUE: 22
PIF_VALUE: 6
PIF_VALUE: 24
PIF_VALUE: 16
PIF_VALUE: 6
PIF_VALUE: 22
PIF_VALUE: 5
PIF_VALUE: 21
PIF_VALUE: 22
PIF_VALUE: 22
PIF_VALUE: 0
PIF_VALUE: 22
PIF_VALUE: 25
PIF_VALUE: 6
PIF_VALUE: 22
PIF_VALUE: 5
PIF_VALUE: 23
PIF_VALUE: 24
PIF_VALUE: 23
PIF_VALUE: 22
PIF_VALUE: 22
PIF_VALUE: 25
PIF_VALUE: 24
PIF_VALUE: 23
PIF_VALUE: 24
PIF_VALUE: 22
PIF_VALUE: 22
PIF_VALUE: 25
PIF_VALUE: 5
PIF_VALUE: 21
PIF_VALUE: 15
PIF_VALUE: 2
PIF_VALUE: 25
PIF_VALUE: 25
PIF_VALUE: 5
PIF_VALUE: 22
PIF_VALUE: 23
PIF_VALUE: 22
PIF_VALUE: 22
PIF_VALUE: 7
PIF_VALUE: 6
PIF_VALUE: 0
PIF_VALUE: 6
PIF_VALUE: 6
PIF_VALUE: 22
PIF_VALUE: 23
PIF_VALUE: 3
PIF_VALUE: 1
PIF_VALUE: 25
PIF_VALUE: 23
PIF_VALUE: 23
PIF_VALUE: 16
PIF_VALUE: 25
PIF_VALUE: 6
PIF_VALUE: 5
PIF_VALUE: 21
PIF_VALUE: 6
PIF_VALUE: 5
PIF_VALUE: 22
PIF_VALUE: 25
PIF_VALUE: 22
PIF_VALUE: 5
PIF_VALUE: 24
PIF_VALUE: 22
PIF_VALUE: 25
PIF_VALUE: 1
PIF_VALUE: 22
PIF_VALUE: 22
PIF_VALUE: 24
PIF_VALUE: 22
PIF_VALUE: 23
PIF_VALUE: 22
PIF_VALUE: 21
PIF_VALUE: 25
PIF_VALUE: 25
PIF_VALUE: 22
PIF_VALUE: 22
PIF_VALUE: 5
PIF_VALUE: 6
PIF_VALUE: 23
PIF_VALUE: 5
PIF_VALUE: 22
PIF_VALUE: 25
PIF_VALUE: 6
PIF_VALUE: 22
PIF_VALUE: 5
PIF_VALUE: 23
PIF_VALUE: 22
PIF_VALUE: 21
PIF_VALUE: 24
PIF_VALUE: 22
PIF_VALUE: 6
PIF_VALUE: 23
PIF_VALUE: 22
PIF_VALUE: 6
PIF_VALUE: 23
PIF_VALUE: 24
PIF_VALUE: 6
PIF_VALUE: 25
PIF_VALUE: 22
PIF_VALUE: 26
PIF_VALUE: 23
PIF_VALUE: 23
PIF_VALUE: 6
PIF_VALUE: 22
PIF_VALUE: 1
PIF_VALUE: 24
PIF_VALUE: 23
PIF_VALUE: 24
PIF_VALUE: 22
PIF_VALUE: 25

## 2021-06-30 ASSESSMENT — PAIN DESCRIPTION - PAIN TYPE
TYPE: ACUTE PAIN;SURGICAL PAIN
TYPE: SURGICAL PAIN
TYPE: ACUTE PAIN;SURGICAL PAIN

## 2021-06-30 ASSESSMENT — PAIN DESCRIPTION - ORIENTATION
ORIENTATION: RIGHT

## 2021-06-30 ASSESSMENT — PAIN DESCRIPTION - ONSET
ONSET: AWAKENED FROM SLEEP
ONSET: ON-GOING

## 2021-06-30 ASSESSMENT — PAIN DESCRIPTION - DESCRIPTORS
DESCRIPTORS: ACHING;SORE
DESCRIPTORS: SORE;ACHING
DESCRIPTORS: ACHING;SORE
DESCRIPTORS: SORE;ACHING
DESCRIPTORS: ACHING
DESCRIPTORS: ACHING;DULL

## 2021-06-30 ASSESSMENT — PAIN DESCRIPTION - LOCATION
LOCATION: HIP

## 2021-06-30 ASSESSMENT — LIFESTYLE VARIABLES: SMOKING_STATUS: 0

## 2021-06-30 ASSESSMENT — PAIN DESCRIPTION - PROGRESSION
CLINICAL_PROGRESSION: GRADUALLY WORSENING
CLINICAL_PROGRESSION: GRADUALLY IMPROVING

## 2021-06-30 ASSESSMENT — PAIN - FUNCTIONAL ASSESSMENT: PAIN_FUNCTIONAL_ASSESSMENT: 0-10

## 2021-06-30 ASSESSMENT — PAIN SCALES - GENERAL
PAINLEVEL_OUTOF10: 5
PAINLEVEL_OUTOF10: 7
PAINLEVEL_OUTOF10: 4
PAINLEVEL_OUTOF10: 5
PAINLEVEL_OUTOF10: 4
PAINLEVEL_OUTOF10: 0
PAINLEVEL_OUTOF10: 7

## 2021-06-30 ASSESSMENT — PAIN DESCRIPTION - FREQUENCY
FREQUENCY: CONTINUOUS

## 2021-06-30 NOTE — H&P
[facility] Update H & P  Department of General Surgery   Surgical Service   Pre-operative History and Physical  Last H & P within the last 30 days. DIAGNOSIS:   Osteoarthritis of right hip, unspecified osteoarthritis type [M16.11]    Procedure(s):  RIGHT TOTAL HIP ARTHROPLASTY ANTERIOR    HISTORY OF PRESENT ILLNESS:   Patient is a morbidly obese (Body mass index is 40.18 kg/m².), 73 y.o. male with chronic right hip pain and limited ROM in the setting of arthrosis. The symptoms have been recalcitrant to conservative treatment and the patient presents today for the above procedure. Covid 19:  Patient denies fever, chills, cough or known exposure to Covid-19. Past Medical History:        Diagnosis Date    Arthritis     Hyperlipidemia     Hypertension      Past Surgical History:        Procedure Laterality Date    COLONOSCOPY      JOINT REPLACEMENT Bilateral     knee    NOSE SURGERY       Past Social History:  Social History     Socioeconomic History    Marital status:      Spouse name: None    Number of children: None    Years of education: None    Highest education level: None   Occupational History    None   Tobacco Use    Smoking status: Former Smoker     Types: Cigarettes     Quit date: 2002     Years since quittin.0    Smokeless tobacco: Former User   Substance and Sexual Activity    Alcohol use: Not Currently    Drug use: Not Currently    Sexual activity: None   Other Topics Concern    None   Social History Narrative    None     Social Determinants of Health     Financial Resource Strain:     Difficulty of Paying Living Expenses:    Food Insecurity:     Worried About Running Out of Food in the Last Year:     Ran Out of Food in the Last Year:    Transportation Needs:     Lack of Transportation (Medical):      Lack of Transportation (Non-Medical):    Physical Activity:     Days of Exercise per Week:     Minutes of Exercise per Session:    Stress:     Feeling of Stress :    Social Connections:     Frequency of Communication with Friends and Family:     Frequency of Social Gatherings with Friends and Family:     Attends Christianity Services:     Active Member of Clubs or Organizations:     Attends Club or Organization Meetings:     Marital Status:    Intimate Partner Violence:     Fear of Current or Ex-Partner:     Emotionally Abused:     Physically Abused:     Sexually Abused:          Medications Prior to Admission:      Prior to Admission medications    Medication Sig Start Date End Date Taking? Authorizing Provider   atorvastatin (LIPITOR) 80 MG tablet Take 80 mg by mouth daily 5/19/21  Yes Historical Provider, MD   lisinopril-hydroCHLOROthiazide (PRINZIDE;ZESTORETIC) 20-12.5 MG per tablet Take 1 tablet by mouth daily 5/19/21  Yes Historical Provider, MD   Prenatal Vit-Fe Fumarate-FA (PRENATAL VITAMIN) 27-1 MG TABS tablet TAKE ONE TABLET BY MOUTH TWICE A DAY 6/22/21   CHINO Pepe   Multiple Vitamins-Minerals (MULTIVITAMIN ADULTS PO) Take 1 tablet by mouth daily    Historical Provider, MD   acyclovir (ZOVIRAX) 200 MG capsule Take 200 mg by mouth See Admin Instructions 5/19/21   Historical Provider, MD   Aspirin Buf,CaCarb-MgCarb-MgO, 81 MG TABS Take 81 mg by mouth daily    Historical Provider, MD   meloxicam (MOBIC) 15 MG tablet Take 15 mg by mouth daily 5/19/21   Historical Provider, MD         Allergies:  Patient has no known allergies.     PHYSICAL EXAM:      /79   Pulse 70   Temp 98 °F (36.7 °C) (Oral)   Resp 16   Ht 5' 10\" (1.778 m)   Wt 280 lb (127 kg)   SpO2 94%   BMI 40.18 kg/m²      Airway:  Airway patent with no audible stridor    Heart:  Regular rate and rhythm, No murmur noted    Lungs:  No increased work of breathing, good air exchange, clear to auscultation bilaterally, no crackles or wheezing    Abdomen:  Soft, non-distended, non-tender, normal active bowel sounds, no masses palpated    ASSESSMENT AND PLAN    Patient is a 73 y.o. male with above specified procedure planned. 1.  The patients history and physical was obtained and signed off by the pre-admission testing department. Patient seen and focused exam done today- no new changes since last physical exam on 6/2/21    2. Access to ancillary services are available per request of the provider.     Yao Young, APRN - CNP     6/30/2021

## 2021-06-30 NOTE — PROGRESS NOTES
PACU Transfer to Floor Note    Current Allergies: Patient has no known allergies. Pt meets criteria as per Sachi Score and ASPAN Standards to transfer to next phase of care. No results for input(s): POCGLU in the last 72 hours. Vitals:    06/30/21 1615   BP: 102/66   Pulse: 96   Resp: 13   Temp: 98 °F (36.7 °C)   SpO2: 95%        SpO2: 95 %    O2 Flow Rate (L/min): 2 L/min      Intake/Output Summary (Last 24 hours) at 6/30/2021 1634  Last data filed at 6/30/2021 1446  Gross per 24 hour   Intake 2300 ml   Output 1085 ml   Net 1215 ml     Surgical incision to right hip covered with wound vac dressing - CDI. JULIEN wound vac attached and operating correctly. Pain assessment:  present - adequately treated    Pain Level: 5 (po Norco given)    Patient was assessed for alterations to skin integrity. There were no alterations observed.     Is patient incontinent: no    Handoff report given at bedside to RN for 5523  Family (wife Jeanine Morning) updated and directed to pt room      6/30/2021 4:34 PM

## 2021-06-30 NOTE — ANESTHESIA POSTPROCEDURE EVALUATION
Department of Anesthesiology  Postprocedure Note    Patient: Christ Stone  MRN: [id number]  YOB: 1955  Date of evaluation: 6/30/2021  Time:  3:55 PM     Procedure Summary     Date: 06/30/21 Room / Location: [facility] [address]    Anesthesia Start: 9320 Anesthesia Stop: 1452    Procedure: RIGHT TOTAL HIP ARTHROPLASTY ANTERIOR (Right ) Diagnosis:       Osteoarthritis of right hip, unspecified osteoarthritis type      (Osteoarthritis of right hip, unspecified osteoarthritis type [M16.11])    Surgeons: Mar Sosa MD Responsible Provider: Armen Bragg DO    Anesthesia Type: general ASA Status: 3          Anesthesia Type: general    Sachi Phase I: Sachi Score: 8    Sachi Phase II:      Last vitals: Reviewed and per EMR flowsheets.        Anesthesia Post Evaluation    Patient location during evaluation: PACU  Patient participation: complete - patient participated  Level of consciousness: awake and alert  Pain score: 4  Airway patency: patent  Nausea & Vomiting: no nausea and no vomiting  Complications: no  Cardiovascular status: hemodynamically stable  Respiratory status: acceptable  Hydration status: stable

## 2021-06-30 NOTE — ANESTHESIA PRE PROCEDURE
mL Intravenous 2 times per day Jori Dee Dee, DO        sodium chloride flush 0.9 % injection 10 mL  10 mL Intravenous PRN Jori Dee Dee, DO        0.9 % sodium chloride infusion  25 mL Intravenous PRN Jori Dee Dee, DO           Allergies:  No Known Allergies    Problem List:  There is no problem list on file for this patient. Past Medical History:        Diagnosis Date    Arthritis     Hyperlipidemia     Hypertension        Past Surgical History:        Procedure Laterality Date    COLONOSCOPY      JOINT REPLACEMENT Bilateral     knee    NOSE SURGERY         Social History:    Social History     Tobacco Use    Smoking status: Former Smoker     Types: Cigarettes     Quit date: 2002     Years since quittin.0    Smokeless tobacco: Former User   Substance Use Topics    Alcohol use: Not Currently                                Counseling given: Not Answered      Vital Signs (Current):   Vitals:    21 1306 21 0722   BP:  121/79   Pulse:  70   Resp:  16   Temp:  98 °F (36.7 °C)   TempSrc:  Oral   SpO2:  94%   Weight: 280 lb (127 kg) 280 lb (127 kg)   Height: 5' 10\" (1.778 m) 5' 10\" (1.778 m)                                              BP Readings from Last 3 Encounters:   21 121/79   21 98/61       NPO Status: Time of last liquid consumption: 0                        Time of last solid consumption:                         Date of last liquid consumption: 21                        Date of last solid food consumption: 21    BMI:   Wt Readings from Last 3 Encounters:   21 280 lb (127 kg)   21 280 lb (127 kg)     Body mass index is 40.18 kg/m².     CBC: No results found for: WBC, RBC, HGB, HCT, MCV, RDW, PLT    CMP: No results found for: NA, K, CL, CO2, BUN, CREATININE, GFRAA, AGRATIO, LABGLOM, GLUCOSE, PROT, CALCIUM, BILITOT, ALKPHOS, AST, ALT    POC Tests: No results for input(s): POCGLU, POCNA, POCK, POCCL, POCBUN, POCHEMO, POCHCT in the last 72 hours. Coags: No results found for: PROTIME, INR, APTT    HCG (If Applicable): No results found for: PREGTESTUR, PREGSERUM, HCG, HCGQUANT     ABGs: No results found for: PHART, PO2ART, EVE2FKI, RAZ0YFR, BEART, F5TAENBT     Type & Screen (If Applicable):  No results found for: LABABO, LABRH    Drug/Infectious Status (If Applicable):  No results found for: HIV, HEPCAB    COVID-19 Screening (If Applicable): No results found for: COVID19        Anesthesia Evaluation  Patient summary reviewed and Nursing notes reviewed no history of anesthetic complications:   Airway: Mallampati: II  TM distance: >3 FB   Neck ROM: full  Mouth opening: > = 3 FB Dental: normal exam         Pulmonary: breath sounds clear to auscultation  (+) sleep apnea (un diagnosed sharita):      (-) not a current smoker (quit 2002   3 ppd     )                           Cardiovascular:  Exercise tolerance: good (>4 METS),   (+) hypertension: moderate,     (-) past MI    NYHA Classification: II    Rhythm: regular  Rate: normal           Beta Blocker:  Not on Beta Blocker         Neuro/Psych:               GI/Hepatic/Renal:   (+) morbid obesity     (-) GERD       Endo/Other:    (+) : arthritis: OA., .                 Abdominal:             Vascular: negative vascular ROS. Other Findings:             Anesthesia Plan      general     ASA 3       Induction: intravenous. MIPS: Postoperative opioids intended and Prophylactic antiemetics administered. Anesthetic plan and risks discussed with patient. Plan discussed with CRNA.     Attending anesthesiologist reviewed and agrees with Preprocedure content              Willie Millan DO   6/30/2021

## 2021-06-30 NOTE — PROGRESS NOTES
Patient admitted to [facility] [place] from PACU. Patient is alert and oriented. VSS. Denies nausea at this time.

## 2021-06-30 NOTE — OP NOTE
Operative Note      Patient: Angel Craig  YOB: 1955  MRN: [id number]    Date of Procedure: 6/30/2021    Pre-Op Diagnosis: Osteoarthritis of right hip, unspecified osteoarthritis type [M16.11]    Post-Op Diagnosis: Same       Procedure(s):  RIGHT TOTAL HIP ARTHROPLASTY ANTERIOR    Surgeon(s):  Yanelis Fuchs MD    Assistant:   Surgical Assistant: Jovita Hernandez    Anesthesia: General    Estimated Blood Loss (mL): 1877    Complications: Bleeding    Specimens:   ID Type Source Tests Collected by Time Destination   A : FEMORAL HEAD RIGHT Bone Bone SURGICAL PATHOLOGY Yanelis Fuchs MD 6/30/2021 1159        Implants:  * No implants in log *      Drains:   Negative Pressure Wound Therapy Hip Anterior;Right (Active)       Findings: femur with altered bone sent to pathology. Porous bone but no obvious lesion. ? Of AVN    Detailed Description of Procedure:   Direct Anterior Total Hip Operative Note    Patient: Angel Craig MRN: [id number]     YOB: 1955  Age: 73 y.o. Sex: male      Date of Operation: [unfilled]        Preoperative Diagnosis: End-stage osteoarthritis,  right hip. Postoperative Diagnosis: End-stage osteoarthritis, right hip. Procedure Performed: Direct anterior right total hip arthroplasty. Surgeon: Prabhjot Wright MD  Assistant(s): Circulator: Nahomy Myles RN  Surgical Assistant: Jovita Jordan  Radiology Technologist: Kay Diggs  Scrub Person First: Debi Montano  Scrub Person Second: Sriram Myles RN  Anesthesia: General  Estimated Blood Loss:  1000  Drains: None  Implants:  [employer]   Anthology stem , size 7 , high Offset ; 40mm +4 femoral head. 56 mm  R3 acetabulum with a 20 degree XLPE liner with 2 dome screws. Indication For Operation: This is a 73 y.o. male with end-stage osteoarthritis of he right hip that failed to respond to prolonged nonoperative measures. They had no contraindications to surgery. In the office treatment was thoroughly discussed including above procedure. The relative risks and benefits of direct anterior versus posterior approach of the right hip were discussed. The relative increase risk of iatrogenic fracture, cutaneous nerve injury with a direct anterior approach were discussed along with potential wound healing problems versus the relative risks of dislocation, leg length discrepancy with posterior approach and they elected an anterior approach at this time. They gave informed consent to proceed. Description of Procedure: The patient was taken to the operating room at [facility]. and a after satisfactory induction of general endotracheal anesthesia after preoperative regional block (fascia iliacus). The  righthip and lower extremity were then prepped and draped in the usual sterile fashion for a direct anterior approach. An approximately 10 cm longitudinal incision was made beginning 2 cm distal and posterior to the ASIS. Sharp dissection was carried down through the skin and subcutaneous tissue. The fascia over the fascia evelyn was incised longitudinally in line with the skin incision. Kocher retractors were placed. The tensor fascia muscle anteriorly translated in interval between the gluteus and the tensor muscle. Circumflex vessels were identified and cauterized and Cobra retractors were placed extra capsularly about the femoral neck. The capsule was opened in an inverted T fashion and tagged for later repair. Retractors were then placed intracapsularly and traction was applied. Proximal femoral resection was carried out a fingerbreadth proximal to the lesser trochanter in a napkin ring fashion. The femoral head was extracted using the power corkscrew and excellent direct visualization of the acetabulum was obtained. Retractor was placed anteriorly inferiorly and directly posteriorly as well as directly inferiorly allowing excellent circumferential exposure. The labrum was excised along the soft tissue from the floor of the acetabulum. A curette was used to identify the true medial wall. Initial medialization was carried out and then sequential reaming was carried out with the hemispherical reamers. Final reaming was carried out to a size 55. A size 56 three hole R3 acetabular cup from [employer] was placed on the insertion handle and impacted into place under direct fluoroscopic visualization to ensure appropriate anteversion and inclination. Excellent seating was obtained and good purchase was obtained with 2 supplemental screws. The 20 degree lip XLPE liner was placed in a posterior superior position and good purchase was obtained. Meticulous injection of the posterior and inferior capsule was carried out with 60 cc of injection cocktail. Attention was then turned towards the femoral preparation. Posterior and inferior capsulotomies had been carried out under initial exposure following acetabular placement. The knee was fully extended to the floor in maximum adducted and externally rotated. Superior capsulotomy was carried out allowing good visualization. External rotation of the femur was carried out to a 140 degrees. Retractor was placed posterior to the greater trochanter and the femoral hook was placed to deliver the proximal femur into the wound. The canal was sounded with awl and a curette and maximal lateralization was carried out with the curette and the rongeuer. Sequential broaching was then carried out with the [place] and [employer] broach only system. Neutral varus orientation was maintained. The natural version of the proximal femur was followed. Trial reduction was carried out with the size 7 broach in place to assess leg lengths which appeared equal with a +4 mm head in place. Shuck test was optimal. External rotation was stable beyond 100 degrees  with the leg adducted and extended .  The hip was flexed and internally rotated beyond 70 degrees with no posterior subluxation. C-arm views showed ideal les lengths and broach size and position with the trial in place. Proximal femur was then again exposed and the stem was impacted anatomically with gentle blows of the mallet to the appropriate depth to mutch the broach. The femoral head was implanted securely. Reduction was carried out with a plastic skid. Shuck test range of motion and stability were excellent as indicated above with the trial.     The wound was copiously irrigated with pulsatile lavage and normal saline and betadine wash. The anterior capsule was meticulously repaired with interrupted Ethibond -0 suture along with the elevated portion of the rectus femoris. Excellent anterior capsule repair was obtained. The anterior capsule and deep fascia was then injected with additional 60 cc of injection cocktail. The fascia over the fascia evelyn was closed with a running number 2 Stratofix  Suture with an additional layer of 4-0 monocryl. Subcutaneous layer was closed with 2-0 Stratofix with Durabond and Perinea  applied along with an Acticoat dressing. The patient was reversed from anesthesia, taken out the fracture table, extubated and taken to the recovery room in stable condition, tolerated the procedure well. Sponge and needle counts correct times 2. Irricept wash, vancomycin powder and Orthomix were used intraoperatively. The morbid obesity of greater than 40 in additional to major muscle density in this patient added significant time, effort and complexity creating additional intraoperative difficulty procedurally for this case and is considered an unusual procedural service for this surgery. Complications: None  Disposition: Admission after recovery  Condition: Stable  Attending Attestation: I was present and scrubbed for the entire procedure.     Signed by: Elise Rcio MD, 6/30/2021      Electronically signed by Elise Rico MD on 6/30/2021 at 2:10 PM

## 2021-06-30 NOTE — PROGRESS NOTES
Patient admitted to PACU # 6 from OR at 1451 post RIGHT TOTAL HIP ARTHROPLASTY ANTERIOR - Right per Matthew Green MD  .  Attached to PACU monitoring system and report received from anesthesia provider. Patient was reported to be hemodynamically stable during procedure. Patient drowsy on admission and arrived to PACU in 5/10 pain to right hip. Surgical site to right hip is covered with wound vac dressing - CDI. JULIEN wound vac attached and operating correctly.

## 2021-07-01 VITALS
DIASTOLIC BLOOD PRESSURE: 76 MMHG | RESPIRATION RATE: 16 BRPM | HEIGHT: 70 IN | SYSTOLIC BLOOD PRESSURE: 123 MMHG | HEART RATE: 82 BPM | WEIGHT: 280 LBS | BODY MASS INDEX: 40.09 KG/M2 | TEMPERATURE: 98 F | OXYGEN SATURATION: 96 %

## 2021-07-01 LAB
ANION GAP SERPL CALCULATED.3IONS-SCNC: 12 MMOL/L (ref 3–16)
BUN BLDV-MCNC: 20 MG/DL (ref 7–20)
CALCIUM SERPL-MCNC: 8.9 MG/DL (ref 8.3–10.6)
CHLORIDE BLD-SCNC: 101 MMOL/L (ref 99–110)
CO2: 21 MMOL/L (ref 21–32)
CREAT SERPL-MCNC: 0.9 MG/DL (ref 0.8–1.3)
ESTIMATED AVERAGE GLUCOSE: 116.9 MG/DL
GFR AFRICAN AMERICAN: >60
GFR NON-AFRICAN AMERICAN: >60
GLUCOSE BLD-MCNC: 146 MG/DL (ref 70–99)
HBA1C MFR BLD: 5.7 %
HCT VFR BLD CALC: 35.2 % (ref 40.5–52.5)
HEMOGLOBIN: 12 G/DL (ref 13.5–17.5)
MCH RBC QN AUTO: 31.2 PG (ref 26–34)
MCHC RBC AUTO-ENTMCNC: 34.1 G/DL (ref 31–36)
MCV RBC AUTO: 91.4 FL (ref 80–100)
PDW BLD-RTO: 13.3 % (ref 12.4–15.4)
PLATELET # BLD: 216 K/UL (ref 135–450)
PMV BLD AUTO: 8.8 FL (ref 5–10.5)
POTASSIUM SERPL-SCNC: 4 MMOL/L (ref 3.5–5.1)
RBC # BLD: 3.85 M/UL (ref 4.2–5.9)
SODIUM BLD-SCNC: 134 MMOL/L (ref 136–145)
WBC # BLD: 18.2 K/UL (ref 4–11)

## 2021-07-01 PROCEDURE — G0378 HOSPITAL OBSERVATION PER HR: HCPCS

## 2021-07-01 PROCEDURE — 6370000000 HC RX 637 (ALT 250 FOR IP): Performed by: PHYSICIAN ASSISTANT

## 2021-07-01 PROCEDURE — 97535 SELF CARE MNGMENT TRAINING: CPT

## 2021-07-01 PROCEDURE — 80048 BASIC METABOLIC PNL TOTAL CA: CPT

## 2021-07-01 PROCEDURE — 85027 COMPLETE CBC AUTOMATED: CPT

## 2021-07-01 PROCEDURE — 2580000003 HC RX 258: Performed by: PHYSICIAN ASSISTANT

## 2021-07-01 PROCEDURE — 94150 VITAL CAPACITY TEST: CPT

## 2021-07-01 PROCEDURE — 97530 THERAPEUTIC ACTIVITIES: CPT

## 2021-07-01 PROCEDURE — 97161 PT EVAL LOW COMPLEX 20 MIN: CPT

## 2021-07-01 PROCEDURE — 97165 OT EVAL LOW COMPLEX 30 MIN: CPT

## 2021-07-01 PROCEDURE — 97116 GAIT TRAINING THERAPY: CPT

## 2021-07-01 PROCEDURE — 99253 IP/OBS CNSLTJ NEW/EST LOW 45: CPT | Performed by: INTERNAL MEDICINE

## 2021-07-01 PROCEDURE — 36415 COLL VENOUS BLD VENIPUNCTURE: CPT

## 2021-07-01 PROCEDURE — 6360000002 HC RX W HCPCS: Performed by: PHYSICIAN ASSISTANT

## 2021-07-01 RX ORDER — SENNA AND DOCUSATE SODIUM 50; 8.6 MG/1; MG/1
1 TABLET, FILM COATED ORAL 2 TIMES DAILY
COMMUNITY
Start: 2021-07-01

## 2021-07-01 RX ORDER — OXYCODONE HYDROCHLORIDE 5 MG/1
5-10 TABLET ORAL EVERY 4 HOURS PRN
Qty: 28 TABLET | Refills: 0 | Status: SHIPPED | OUTPATIENT
Start: 2021-07-01 | End: 2021-07-04

## 2021-07-01 RX ADMIN — ACETAMINOPHEN 650 MG: 325 TABLET ORAL at 06:33

## 2021-07-01 RX ADMIN — DOCUSATE SODIUM 50 MG AND SENNOSIDES 8.6 MG 1 TABLET: 8.6; 5 TABLET, FILM COATED ORAL at 08:50

## 2021-07-01 RX ADMIN — ASPIRIN 325 MG: 325 TABLET, COATED ORAL at 08:50

## 2021-07-01 RX ADMIN — ACETAMINOPHEN 650 MG: 325 TABLET ORAL at 11:28

## 2021-07-01 RX ADMIN — OXYCODONE 5 MG: 5 TABLET ORAL at 06:33

## 2021-07-01 RX ADMIN — PRENATAL WITH FERROUS FUM AND FOLIC ACID 1 TABLET: 3080; 920; 120; 400; 22; 1.84; 3; 20; 10; 1; 12; 200; 27; 25; 2 TABLET ORAL at 08:52

## 2021-07-01 RX ADMIN — LISINOPRIL AND HYDROCHLOROTHIAZIDE 1 TABLET: 12.5; 2 TABLET ORAL at 08:50

## 2021-07-01 RX ADMIN — ATORVASTATIN CALCIUM 80 MG: 80 TABLET, FILM COATED ORAL at 08:50

## 2021-07-01 RX ADMIN — Medication 10 ML: at 08:50

## 2021-07-01 RX ADMIN — Medication: at 08:50

## 2021-07-01 RX ADMIN — CEFAZOLIN SODIUM 3000 MG: 1 INJECTION, POWDER, FOR SOLUTION INTRAMUSCULAR; INTRAVENOUS at 03:39

## 2021-07-01 ASSESSMENT — PAIN SCALES - GENERAL
PAINLEVEL_OUTOF10: 0
PAINLEVEL_OUTOF10: 1
PAINLEVEL_OUTOF10: 7

## 2021-07-01 NOTE — CARE COORDINATION
Case Management            Discharge Note                    Date / Time of Note: 7/1/2021 1:46 PM                  Discharge Note Completed by: DAMION Meza    Patient Name: Mandeep Anderson   YOB: 1955  Diagnosis: Osteoarthritis of right hip, unspecified osteoarthritis type [M16.11]  Status post total hip replacement, right [Z96.641]   Date / Time: 6/30/2021  7:11 AM    Current PCP: Rafaela Elias patient: No    Hospitalization in the last 30 days: No    Advance Directives:  Code Status: Full Code  [place] DNR form completed and on chart: Not Indicated    Financial:  Payor: Jelena Spaulding / Plan: [facility] - [place] PPO / Product Type: *No Product type* /      Pharmacy:    [facility], [address] [place] [address]  Phone: [phone] Fax: [phone]      Assistance purchasing medications?: Potential Assistance Purchasing Medications: No  Assistance provided by Case Management: None at this time    Does patient want to participate in local refill/ meds to beds program?: Yes    Meds To Beds General Rules:  1. Can ONLY be done Monday- Friday between 8:30am-5pm  2. Prescription(s) must be in pharmacy by 3pm to be filled same day  3. Copy of patient's insurance/ prescription drug card and patient face sheet must be sent along with the prescription(s)  4. Cost of Rx cannot be added to hospital bill. If financial assistance is needed, please contact unit  or ;  or  CANNOT provide pharmacy voucher for patients co-pays  5.  Patients can then  the prescription on their way out of the hospital at discharge, or pharmacy can deliver to the bedside if staff is available. (payment due at time of pick-up or delivery - cash, check, or card accepted)     Able to afford home medications/ co-pay costs: Yes    ADLS:  Current PT AM-PAC Score: 18 /24  Current OT AM-PAC Score: 21 /24      Discharge Disposition: Home with [address]: PT/OT     LOC at discharge: Skilled  SONDRA Completed: Yes    Notification completed in HENS/PAS?:  Not Applicable    IMM Completed:   Not Indicated    Transportation:  Transportation Plan for discharge: family   Mode of Transport: Private Car    Transportation form completed: Not Indicated    Home Care:  [address] ordered at discharge: Yes  [address]: [place]  Phone: [phone]  Fax: [phone]  Orders faxed: Yes    Durable Medical Equipment:  DME Provider: None   Equipment obtained during hospitalization: Has needed DME at home     Additional CM Notes:   SW met with patient at bedside on this date. Patient medically ready for discharge. Patient to return home. He has support from family in the community. He is agreeable to home health care. Patient set up with [address] [place] [address]. Patient has needed DME at home. Patient stated he will have transport. No other SW needs     The Plan for Transition of Care is related to the following treatment goals Osteoarthritis of right hip, unspecified osteoarthritis type [M16.11]  Status post total hip replacement, right [F28.541]      The Patient and/or patient representative was provided with a choice of provider and agrees with the discharge plan Yes    Freedom of choice list was provided with basic dialogue that supports the patient's individualized plan of care/goals and shares the quality data associated with the providers.  Yes    Care Transitions patient: No    DAMION Devlin  [facility]   Case Management Department  Ph: [phone]

## 2021-07-01 NOTE — PLAN OF CARE
Problem: Pain:  Goal: Pain level will decrease  Description: Pain level will decrease  Outcome: Ongoing   Numeric pain rating scale being used. Patient repositioned for comfort. Ice applied. Patient is tolerating PO pain medicine. Problem: Falls - Risk of:  Goal: Will remain free from falls  Description: Will remain free from falls  Outcome: Ongoing   Patient has remained free of falls. 2/4 bed rails up, bed locked and in lowest position, call light within reach. Patient instructed on use of call light and uses appropriately. Bed alarm on. Non-skid footwear and fall band on.

## 2021-07-01 NOTE — PROGRESS NOTES
Patient alert and oriented x4. VSS. Dressing to hip is CDI. Neuro checks WNL. Patient tolerating ambulation well. Yet to void on own. Pain controlled with PO pain medicine. Fall precautions in place.

## 2021-07-01 NOTE — PLAN OF CARE
Problem: Pain:  Goal: Pain level will decrease  Description: Pain level will decrease  7/1/2021 0746 by Dania Loya RN  Outcome: Ongoing     Problem: Falls - Risk of:  Goal: Will remain free from falls  Description: Will remain free from falls  7/1/2021 0746 by Dania Loya RN  Outcome: Ongoing  7/1/2021 0146 by Royal Tee RN  Outcome: Ongoing

## 2021-07-01 NOTE — PROGRESS NOTES
Patient ambulated around hallway and tolerated well SBA with walker. Attempted to void but unsuccessful. Bladder scanned for 300 ml. Patient refusing straight cath at this time. Nurse educated on importance of emptying bladder.

## 2021-07-01 NOTE — PROGRESS NOTES
Occupational Therapy   Occupational Therapy Initial Assessment, Treatment, and Discharge  Date: 2021   Patient Name: Kimberlee Peterson  MRN: [id number]     : 1955    Date of Service: 2021    Discharge Recommendations: Kimberlee Peterson scored a 21/24 on the AM-[facility] ADL Inpatient form. At this time, no further OT is recommended upon discharge. Recommend patient returns to prior setting with prior services. OT Equipment Recommendations  Equipment Needed: No    Assessment   Assessment: Pt from home w/ wife. Pt reports wife is able to provide 24 hr assist at d/c. Pt functioning close to baseline - needing SBA progressing to supvn for functional mobility, transfers, and ADLs for safety. Pt reports no concerns for returning home and has all needed DME. Pt educated on hip precautions and completing ADLs within precautions. Pt reports he is returning home today. Recommend assist PRN at d.c. No acute OT needs. Will sign off services. Prognosis: Good  Decision Making: Low Complexity  OT Education: Transfer Training;OT Role;Precautions;Plan of Care;ADL Adaptive Strategies  Patient Education: Pt educated on hip precautions and completing showering, bathing and dressing within precautions. pt verbalized understanding  No Skilled OT: At baseline function  REQUIRES OT FOLLOW UP: No  Activity Tolerance  Activity Tolerance: Patient Tolerated treatment well  Safety Devices  Safety Devices in place: Yes  Type of devices: Left in chair;Chair alarm in place;Call light within reach;Nurse notified           Patient Diagnosis(es): The encounter diagnosis was Osteoarthritis of right hip, unspecified osteoarthritis type. has a past medical history of Arthritis, Hyperlipidemia, and Hypertension. has a past surgical history that includes joint replacement (Bilateral); Nose surgery; Colonoscopy; and Total hip arthroplasty (Right, 2021).            Restrictions  Position Activity Restriction  Other position/activity restrictions: Ant hip precautions, FWBAT, progressive mobility with assist    Subjective   General  Chart Reviewed: Yes  Patient assessed for rehabilitation services?: Yes  Additional Pertinent Hx: admit 6/30 for R FRANCO; PMHx - arthritis, HLD, HTN, B TKA  Family / Caregiver Present: No  Diagnosis: R FRANCO  Subjective  Subjective: \"I am going home today\" Pt found in bed. Pt agreeable to OOB/OT eval and tx. Pain: hip pain, unrated, RN aware    Social/Functional History  Social/Functional History  Lives With: Spouse  Type of Home: House  Home Layout: One level, Laundry in basement  Home Access: Stairs to enter with rails  Entrance Stairs - Number of Steps: 6 SAVANNA  Entrance Stairs - Rails: Both  Bathroom Shower/Tub: Walk-in shower, Doors  Bathroom Toilet: Standard  Bathroom Equipment: Tub transfer bench  Home Equipment: Rolling walker, Cane, Reacher (shoe horn)  ADL Assistance: Independent  Homemaking Assistance: Independent (wife does the household items but he can)  Homemaking Responsibilities: Yes  Ambulation Assistance: Independent  Transfer Assistance: Independent  Active : Yes  Occupation: Full time employment  Type of occupation:  for [address] [place] [address] Avenue: fishing  Additional Comments: Pt reports no recent falls. Wife is able to provide 24hr A at d/c.       Objective  Treatment included functional transfer training, ADL's and pt. education.     Vision: Impaired  Vision Exceptions: Wears glasses for reading  Hearing: Within functional limits    Orientation  Overall Orientation Status: Within Functional Limits     Balance  Sitting Balance: Independent  Standing Balance: Stand by assistance (CGA on initial stance but progressing to SBA/supvn)  Standing Balance  Time: ~6-8 mins total  Activity: functional transfers/functional mobility/oral care/LB dressing  Comment: 2WW  Functional Mobility  Functional - Mobility Device: Rolling Walker  Activity: To/from bathroom  Assist Level: Stand by assistance (CGA on initial stance for safety but progressed to SBA/supvn)  Toilet Transfers  Toilet - Technique: Ambulating  Equipment Used: Standard toilet (grab bars)  Toilet Transfer: Stand by assistance  Toilet Transfers Comments: 2WW; v.cues for hand placement/safety  ADL  Grooming: Stand by assistance (oral care at the sink)  UE Dressing: Supervision (doff gown, hong tshirt)  LE Dressing: Stand by assistance (don underwear and shorts)  Toileting: Stand by assistance  Tone RUE  RUE Tone: Normotonic  Tone LUE  LUE Tone: Normotonic  Coordination  Movements Are Fluid And Coordinated: Yes     Bed mobility  Supine to Sit: Modified independent (HOB raised, rail used)  Transfers  Sit to stand: Stand by assistance (CGA on initial sit to stand for safety, progressing to SBA for all other transfers)  Stand to sit: Stand by assistance  Transfer Comments: 2WW, v.cues for hand placement/safety     Cognition  Overall Cognitive Status: WFL                 LUE AROM (degrees)  LUE AROM : WFL  Left Hand AROM (degrees)  Left Hand AROM: WFL  RUE AROM (degrees)  RUE AROM : WFL  Right Hand AROM (degrees)  Right Hand AROM: WFL  LUE Strength  Gross LUE Strength: WFL  RUE Strength  Gross RUE Strength: WFL    AM-PAC Score  AM-[facility] Inpatient Daily Activity Raw Score: 21 (07/01/21 0944)  AM-PAC Inpatient ADL T-Scale Score : 44.27 (07/01/21 0944)  ADL Inpatient CMS 0-100% Score: 32.79 (07/01/21 0944)  ADL Inpatient CMS G-Code Modifier : CJ (07/01/21 0944)    Therapy Time   Individual Concurrent Group Co-treatment   Time In 0846         Time Out 0924         Minutes 38              Timed Code Treatment Minutes:   24 mins    Total Treatment Minutes:  38 mins      Christophe Chacon, S/OT

## 2021-07-01 NOTE — PROGRESS NOTES
Physical Therapy    Facility/Department: [facility]  Initial Assessment/Treatment/Discharge Summary     NAME: Jovita Mullins  : 1955  MRN: [id number]    Date of Service: 2021    Discharge Recommendations:    Jovita Mullins scored a 18/24 on the AM-PAC short mobility form. Current research shows that an AM-PAC score of 18 or greater is typically associated with a discharge to the patient's home setting. Based on the patient's AM-PAC score and their current functional mobility deficits, it is recommended that the patient have 2-3 sessions per week of Physical Therapy at d/c to increase the patient's independence. At this time, this patient demonstrates the endurance and safety to discharge home with A (home vs OP services) and a follow up treatment frequency of 2-3x/wk. Please see assessment section for further patient specific details. PT Equipment Recommendations  Equipment Needed: No    Assessment   Assessment: 74 y/o pt s/p R FRANCO. Pt currently moving very well POD 1. Pt SBA for transfers and stair negotiation and supervision for amb with RW. Pt planning to d/c home with A from wife. Pt verb no safety concerns about d/c. Pt with no further acute PT needs at this time - will sign off from PT services. Prognosis: Excellent  Decision Making: Low Complexity  Patient Education: role of PT, use of call light, d/c planning; pt verb understanding  Barriers to Learning: none  REQUIRES PT FOLLOW UP: No  Activity Tolerance  Activity Tolerance: Patient Tolerated treatment well       Patient Diagnosis(es): The encounter diagnosis was Osteoarthritis of right hip, unspecified osteoarthritis type. has a past medical history of Arthritis, Hyperlipidemia, and Hypertension. has a past surgical history that includes joint replacement (Bilateral); Nose surgery; Colonoscopy; and Total hip arthroplasty (Right, 2021).     Restrictions  Position Activity Restriction  Other position/activity restrictions: Ant hip precautions, FWBAT, progressive mobility with assist  Vision/Hearing  Vision: Impaired  Vision Exceptions: Wears glasses for reading  Hearing: Within functional limits     Subjective  General  Chart Reviewed: Yes  Patient assessed for rehabilitation services?: Yes  Additional Pertinent Hx: 74 y/o pt s/p R ant FRANCO. Family / Caregiver Present: No  Referring Practitioner: CHINO Worley  Diagnosis: s/p R ant FRANCO  Follows Commands: Within Functional Limits  Subjective  Subjective: Pt found sitting up in chair upon arrival, reporting 1/10 pain and agreeable to therapy. Orientation  Orientation  Overall Orientation Status: Within Normal Limits  Social/Functional History  Social/Functional History  Lives With: Spouse  Type of Home: House  Home Layout: One level, Laundry in basement  Home Access: Stairs to enter with rails  Entrance Stairs - Number of Steps: 6 SAVANNA  Entrance Stairs - Rails: Both  Bathroom Shower/Tub: Walk-in shower, Doors  Bathroom Toilet: Standard  Bathroom Equipment: Tub transfer bench  Home Equipment: Rolling walker, Cane, Reacher (shoe horn)  ADL Assistance: Independent  Homemaking Assistance: Independent (wife does the household items but he can)  Homemaking Responsibilities: Yes  Ambulation Assistance: Independent  Transfer Assistance: Independent  Active : Yes  Occupation: Full time employment  Type of occupation:  for [address] [place] [address] Avenue: fishing  Additional Comments: Pt reports no recent falls. Wife is able to provide 24hr A at d/c.    Objective    AROM RLE (degrees)  RLE AROM: WNL  AROM LLE (degrees)  LLE AROM : WNL  Strength RLE  Strength RLE: WFL  Strength LLE  Strength LLE: WFL        Bed mobility  Comment: NT - pt up in chair at start and end of session.   Transfers  Sit to Stand: Stand by assistance  Stand to sit: Stand by assistance  Ambulation  Ambulation?: Yes  WB Status: FWBAT  Ambulation 1  Surface: level tile  Device: [address] [facility]: Supervision  Quality of Gait: safe and steady with no LOB or near LOB  Distance: 400'  Stairs/Curb  Stairs?: Yes  Stairs  # Steps : 12  Stairs Height: 6\"  Rails: Right ascending  Device: No Device  Assistance: Supervision  Comment: safe and steady     Balance  Posture: Good  Sitting - Static: Good  Sitting - Dynamic: Good  Standing - Static: Good  Standing - Dynamic: Good      Plan   Plan  Times per week: d/c acute PT  Safety Devices  Type of devices: Nurse notified, Gait belt, Left in chair, Call light within reach, Chair alarm in place    AM-PAC Score  AM-PAC Inpatient Mobility Raw Score : 18 (07/01/21 1009)  AM-PAC Inpatient T-Scale Score : 43.63 (07/01/21 1009)  Mobility Inpatient CMS 0-100% Score: 46.58 (07/01/21 1009)  Mobility Inpatient CMS G-Code Modifier : CK (07/01/21 1009)     Therapy Time   Individual Concurrent Group Co-treatment   Time In 0924         Time Out 1002         Minutes 38           Timed Code Treatment Minutes:  23    Total Treatment Minutes: 38     Kane Pi, PT, DPT

## 2021-07-01 NOTE — CONSULTS
[address]                                  CONSULTATION    PATIENT NAME: Savita Benton                   :        1955  MED REC NO:   [id number]                          ROOM:       5523  ACCOUNT NO:   [de-identified]                           ADMIT DATE: 2021  PROVIDER:     Zaida Posada MD    CONSULT DATE:  2021    INTERNAL MEDICINE CONSULT NOTE    HISTORY OF PRESENT ILLNESS:  This patient is a 67-year-old white male  with a history of hypertension, hyperlipidemia, but no known  cardiopulmonary disease, who was admitted for total hip replacement on  the right side. Postoperatively denies any chest pain, shortness of  breath or any other problems. PRESENT MEDICATIONS:  As per the med sheet. SOCIAL HISTORY:  He has not smoked for more than 10 years and no alcohol  for three years. He is a . FAMILY HISTORY:  Otherwise noncontributory. REVIEW OF SYSTEMS:  Otherwise noncontributory. PHYSICAL EXAMINATION:  GENERAL:  Currently, he appears in no acute distress. VITAL SIGNS:  Blood pressure is 140/80, pulse rate 80, respiratory rate  12 and easy, he is afebrile. HEENT:  Head:  Atraumatic, normocephalic. Eyes:  Sclerae noninjected. NECK:  No jugular venous distention. LUNGS:  Reveal some decreased breath sounds at the base. HEART:  Reveals a normal S1, S2 without any murmurs or gallops. ABDOMEN:  Soft without tenderness. EXTREMITIES:  Without cyanosis, clubbing or edema. LABORATORY DATA:  Preoperatively show normal electrolytes. His  glycohemoglobin was slightly elevated at 5.7%. Blood sugar 124. Postoperative hemoglobin and hematocrit are 12 and 35.1, white count  18.2 and platelets 212. Elevated white count probably secondary to  steroids given intraoperatively plus stress of surgery. DIAGNOSTIC DATA:  EKGs not in the chart. IMPRESSION:  1. Status post hip replacement. 2.  Hypertension, under fair control. 3.  Hyperlipidemia. PLAN:  1. Continue home meds. 2.  Physical therapy, DVT prophylaxis, incentive spirometry, early  ambulation. 3.  If the patient remains medically stable during the day, no medical  reason to delay discharge. He is to follow up with his primary care  physician in two to four weeks. Thank you for allowing me to see this most interesting patient. We will  follow with you.         Charity Cutler MD    D: 07/01/2021 9:18:05       T: 07/01/2021 9:27:11     [email or web address]  Job#: [id number]     Doc#: [id number]    CC:

## 2021-07-01 NOTE — CARE COORDINATION
[facility]    Patient aware and agreeable to services.  Faxed orders to [address], LPN  Care Transition Nurse  [address]  [phone]

## 2021-07-01 NOTE — PROGRESS NOTES
Department of Orthopedic Surgery  Nurse Practitioner   Progress Note    Subjective:     Post-Operative Day: 1 Status Post right Total Hip Arthroplasty  Systemic or Specific Complaints: Patient seen sitting up in the chair. Pain controlled at this time. Patient is eager to go home. Objective:     Patient Vitals for the past 24 hrs:   BP Temp Temp src Pulse Resp SpO2   07/01/21 1045 123/76 98 °F (36.7 °C) Oral 82 16 96 %   07/01/21 0700 132/73 97.2 °F (36.2 °C) Oral 83 16 95 %   07/01/21 0330 122/77 97.4 °F (36.3 °C) Oral 83 16 95 %   07/01/21 0034 -- -- -- -- -- 95 %   06/30/21 2245 131/80 97.5 °F (36.4 °C) Oral 85 16 95 %   06/30/21 1908 110/68 97.4 °F (36.3 °C) Axillary 80 14 94 %   06/30/21 1706 105/64 97.6 °F (36.4 °C) Oral 93 12 92 %   06/30/21 1615 102/66 98 °F (36.7 °C) Temporal 96 13 95 %   06/30/21 1600 90/64 -- -- 73 13 96 %   06/30/21 1545 109/65 -- -- 74 14 93 %   06/30/21 1530 97/62 -- -- 70 14 93 %   06/30/21 1515 100/66 -- -- 77 15 93 %   06/30/21 1505 (!) 85/56 -- -- 85 15 95 %   06/30/21 1500 96/62 -- -- 84 15 95 %   06/30/21 1451 101/67 97.4 °F (36.3 °C) Temporal 91 8 96 %       General: alert, appears stated age, cooperative and no distress   Wound: Wound clean and dry no evidence of infection. JULIEN   Motion: Painful range of Motion   DVT Exam: No evidence of DVT seen on physical exam.       NVI in lower extremity. Thigh swollen but soft. Moving foot and ankle. Data Review  CBC:   Lab Results   Component Value Date    WBC 18.2 07/01/2021    RBC 3.85 07/01/2021    HGB 12.0 07/01/2021    HCT 35.2 07/01/2021     07/01/2021       Assessment:     Status Post right Total Hip Arthroplasty. Doing well postoperatively. Plan:      1: Discharge today, Return to Clinic: 2 weeks : Post-op labs reviewed and stable. Medicine following.    2:  Continue Deep venous thrombosis prophylaxis - asa 325 mg BID  3:  Continue physical therapy, OT, WBAT  4:  Continue Pain Control  5:  Discharge home with [facility] today. Discharge instructions and SONDRA completed. Scripts sent to meds to beds. Follow up with Dr. Alex Berg.     Tawny Bui, MARIA EUGENIA - CNP

## 2021-07-01 NOTE — PROGRESS NOTES
Pt up in chair. Pt educated on use of incentive spirometer. Pt then demonstrated proper technique and encourage to continue deep breathing exercises.

## 2021-07-06 ENCOUNTER — OFFICE VISIT (OUTPATIENT)
Dept: ORTHOPEDIC SURGERY | Age: 66
End: 2021-07-06

## 2021-07-06 VITALS
BODY MASS INDEX: 40.09 KG/M2 | SYSTOLIC BLOOD PRESSURE: 118 MMHG | HEART RATE: 77 BPM | HEIGHT: 70 IN | DIASTOLIC BLOOD PRESSURE: 64 MMHG | WEIGHT: 280 LBS

## 2021-07-06 DIAGNOSIS — Z96.641 STATUS POST TOTAL HIP REPLACEMENT, RIGHT: Primary | ICD-10-CM

## 2021-07-06 PROCEDURE — 99024 POSTOP FOLLOW-UP VISIT: CPT | Performed by: PHYSICIAN ASSISTANT

## 2021-07-06 RX ORDER — DOCUSATE SODIUM 100 MG/1
100 CAPSULE, LIQUID FILLED ORAL 2 TIMES DAILY PRN
Qty: 60 CAPSULE | Refills: 0 | Status: SHIPPED | OUTPATIENT
Start: 2021-07-06

## 2021-07-06 NOTE — DISCHARGE SUMMARY
Department of Orthopedic Surgery  Nurse Practitioner   Discharge Summary      The Mark Molina is a 73 y.o. male underwent total hip replacement procedure without complication. Mark Molina was admitted to the floor following their recovery in the PACU. Discharge Diagnosis  right Hip Replacement    Current Inpatient Medications    No current facility-administered medications for this encounter. Post-operatively the patients diet was advanced as tolerated and their incision was checked on POD #1. The incision is dressing in place, clean, dry and intact with no signs of infection. The patient remained neurovascularly intact in the lower extremity and had intact pulses distally. Patients calf remained soft and showed no evidence of DVT. The patient was able to move their leg and ankle/foot without any problems post-operatively. Physical therapy and occupational therapy were consulted and began working with the patient post-operatively. The patient progressed with PT/OT as would be expected and continued to improve through their stay. The patients pain was initially controlled with IV medications but we were able to transition to oral pain medications soon after arrival to the floor and their pain remained under good control through their hospital stay. From a medical standpoint the patient remained stable and continued to have the medicine team follow throughout their stay. The patients dressing was changed/incision was checked on day of d/c. The patient will be discharged at this time to Home  with their current diet restrictions and will continue to follow the hip precautions outlined to them by us and PT/OT. Condition on Discharge: Stable    Plan  Return visit in 2 weeks. .  Patient was instructed on the use of pain medications, the signs and symptoms of infection, and was given our number to call should they have any questions or concerns following discharge.     For opioid prescriptions given at discharge the following statement is provided for compliance with [employer] rules. Patient being given increased dosage/quantity of opoid pain medication in excess of [employer] guidelines which noted a 30 MED daily of opioids due to the fact that he/she has undergone major orthopaedic surgery as outlined in rule 4731-11-13. Dosages and further duration of the pain medication will be re-evaluated at her post op visit in 2 weeks. Patient was educated on dosing expectations and limits of prescribing as a result of the new [facility] Board rules enacted August 31, 2017. Please also note that this is not the initial opoid prescription issued to this patient but a continuation of medication utilized during the hospital admission as noted in the medical record. [place]ARRS report has also been utilized to screen for any abuse history or suspicious activity as outlined in [place]. All efforts have been taken to prevent abuse potential and misuse of opioid medications including education, screening, and close clinical follow up.

## 2021-07-15 ENCOUNTER — TELEPHONE (OUTPATIENT)
Dept: ORTHOPEDIC SURGERY | Age: 66
End: 2021-07-15

## 2021-07-15 NOTE — TELEPHONE ENCOUNTER
Medical Facility Question     Facility Name: [address] [place] [address]  Contact Name: Etelvina Smith Number: [phone]  Request or Information: NEED VERBAL ORDER

## 2021-07-28 ENCOUNTER — TELEPHONE (OUTPATIENT)
Dept: ORTHOPEDIC SURGERY | Age: 66
End: 2021-07-28

## 2021-08-02 ENCOUNTER — TELEPHONE (OUTPATIENT)
Dept: ORTHOPEDIC SURGERY | Age: 66
End: 2021-08-02

## 2021-08-02 NOTE — TELEPHONE ENCOUNTER
General Question     Subject: RETURNING PHONE CALL  Patient and /or Facility Request: Danie Rainbow Number: [phone]    PATIENT RETURNING JIAN'S PHONE CALL.

## 2021-08-03 ENCOUNTER — OFFICE VISIT (OUTPATIENT)
Dept: ORTHOPEDIC SURGERY | Age: 66
End: 2021-08-03

## 2021-08-03 VITALS
BODY MASS INDEX: 40.09 KG/M2 | SYSTOLIC BLOOD PRESSURE: 114 MMHG | HEART RATE: 93 BPM | WEIGHT: 280 LBS | DIASTOLIC BLOOD PRESSURE: 77 MMHG | HEIGHT: 70 IN

## 2021-08-03 DIAGNOSIS — Z96.641 STATUS POST TOTAL HIP REPLACEMENT, RIGHT: Primary | ICD-10-CM

## 2021-08-03 DIAGNOSIS — Z96.641 STATUS POST TOTAL HIP REPLACEMENT, RIGHT: ICD-10-CM

## 2021-08-03 LAB
ANION GAP SERPL CALCULATED.3IONS-SCNC: 17 MMOL/L (ref 3–16)
BUN BLDV-MCNC: 14 MG/DL (ref 7–20)
C-REACTIVE PROTEIN: 8.1 MG/L (ref 0–5.1)
CALCIUM SERPL-MCNC: 9.8 MG/DL (ref 8.3–10.6)
CHLORIDE BLD-SCNC: 102 MMOL/L (ref 99–110)
CO2: 22 MMOL/L (ref 21–32)
CREAT SERPL-MCNC: 0.9 MG/DL (ref 0.8–1.3)
GFR AFRICAN AMERICAN: >60
GFR NON-AFRICAN AMERICAN: >60
GLUCOSE BLD-MCNC: 65 MG/DL (ref 70–99)
POTASSIUM SERPL-SCNC: 4.1 MMOL/L (ref 3.5–5.1)
SEDIMENTATION RATE, ERYTHROCYTE: 20 MM/HR (ref 0–20)
SODIUM BLD-SCNC: 141 MMOL/L (ref 136–145)

## 2021-08-03 PROCEDURE — 99024 POSTOP FOLLOW-UP VISIT: CPT | Performed by: ORTHOPAEDIC SURGERY

## 2021-08-03 NOTE — PROGRESS NOTES
Patient Name: Nikolay Oliveros MRN: [id number]   Age: 73 y.o. YOB: 1955   Sex: male         [address] Complaint   Patient presents with    Post-Op Check     RT FRANCO 6/30/21-No pain in the hip area. HISTORY OF PRESENT ILLNESS   Patient returns for their second postoperative evaluation status post total hip replacement. He unfortunately had a fracture of his proximal femur immediately after his office visit and had to obtain emergency surgery with longer prosthesis and cable fixation    He has been doing well there unfortunately had 3 or 4 units of blood subsequently but feels as though he is made a good recovery. His blood pressure medications have not been restarted is not seen his primary care    The patient is doing very well otherwise with his walker. They have minimal complaints of pain. There is moderate swelling about the Hip. The patient has been doing home physical therapy until recently they have discontinued him at this point    They deny any calf swelling or numbness or tingling down the leg     Assessment   PHYSICAL EXAM   Vital Signs:    Vitals:    08/03/21 0919   BP: 114/77   Pulse: 93     Examination of the hip shows a well-healed incision. There is mild swelling. There is no drainage. Range of motion is 100. There is no calf tenderness. The patient is neurovascularly intact. Gait still shows small limp. Fracture of the proximal femur. Walking with a walker with an excellent gait of partial weightbearing      RADIOLOGY     Xray   Have reviewed the xrays above from 08/03/21   and my impression is:  X-Rays reviewed: AP and lateral x-rays of the hip show excellent alignment of the total hip prosthesis.   Fracture fixation appears to be intact with no additional fractures noted in good position of the hardware compared to immediately postoperatively    IMPRESSION   4 weeks status post total hip replacement proximal fracture fixation    PLAN The patient is doing well 4 weeks status post total hip replacement proximal fracture fixation. The patient will slowly resume normal activities.     ICD-10-CM    1. Status post total hip replacement, right  Z96.641 XR HIP 2-3 VW W PELVIS RIGHT     Plan for guarded weightbearing and recheck in another 2 weeks to see his primary surgeon at [facility]  We will obtain blood test to check for CBC and evaluate CRP/sed rate

## 2021-08-03 NOTE — TELEPHONE ENCOUNTER
I spoke with the patient and gave him the option to continue seeing us or if it is more convenient to see the doctor in [place] he can do so. The patient stated he will come in tomorrow.

## 2021-08-06 ENCOUNTER — TELEPHONE (OUTPATIENT)
Dept: ORTHOPEDIC SURGERY | Age: 66
End: 2021-08-06

## 2021-08-06 NOTE — TELEPHONE ENCOUNTER
[address] [place] Name: [address] [place] [address]   Contact Name: Guy Face Number: [phone]  Request or Information: States they are still waiting on Dr. Tod Beal to sign plan of care. States they need it done today. Please advise.

## 2021-08-08 DIAGNOSIS — Z96.641 STATUS POST TOTAL HIP REPLACEMENT, RIGHT: Primary | ICD-10-CM

## (undated) DEVICE — COVER LT HNDL BLU PLAS

## (undated) DEVICE — INTENDED USE FOR SURGICAL MARKING ON INTACT SKIN, ALSO PROVIDES A PERMANENT METHOD OF IDENTIFYING OBJECTS IN THE OPERATING ROOM: Brand: WRITESITE® PLUS MINI PREP RESISTANT MARKER

## (undated) DEVICE — ORTHO PRE OP PACK: Brand: MEDLINE INDUSTRIES, INC.

## (undated) DEVICE — UNDERGLOVE SURG SZ 8 BLU LTX FREE SYN POLYISOPRENE POLYMER

## (undated) DEVICE — SUTURE ETHBND EXCEL SZ 2 L30IN NONABSORBABLE GRN L40MM V-37 MX69G

## (undated) DEVICE — CUFF RESTRN WRST OR ANK 45FT AD FOAM

## (undated) DEVICE — PLATE ES AD W 9FT CRD 2

## (undated) DEVICE — GOWN,SIRUS,POLYRNF,BRTHSLV,XLN/XL,20/CS: Brand: MEDLINE

## (undated) DEVICE — SURE SET-DOUBLE BASIN-LF: Brand: MEDLINE INDUSTRIES, INC.

## (undated) DEVICE — APPLICATOR MEDICATED 26 CC SOLUTION HI LT ORNG CHLORAPREP

## (undated) DEVICE — PACK PROCEDURE SURG TOT HIP

## (undated) DEVICE — GLOVE ORANGE PI 8   MSG9080

## (undated) DEVICE — DECANTER BAG 9": Brand: MEDLINE INDUSTRIES, INC.

## (undated) DEVICE — SOLUTION IV 1000ML 0.9% SOD CHL

## (undated) DEVICE — E-Z CLEAN, NON-STICK, PTFE COATED, ELECTROSURGICAL BLADE ELECTRODE, 2.5 INCH (6.35 CM): Brand: EZ CLEAN

## (undated) DEVICE — 450 ML BOTTLE OF 0.05% CHLORHEXIDINE GLUCONATE IN 99.95% STERILE WATER FOR IRRIGATION, USP AND APPLICATOR.: Brand: IRRISEPT ANTIMICROBIAL WOUND LAVAGE

## (undated) DEVICE — SUTURE MCRYL SZ 2-0 L18IN ABSRB VLT L36MM CT-1 1/2 CIR Y739D

## (undated) DEVICE — GARMENT,MEDLINE,DVT,INT,CALF,MED, GEN2: Brand: MEDLINE

## (undated) DEVICE — SUTURE MCRYL SZ 0 L18IN ABSRB VLT L36MM CT-1 1/2 CIR Y740D

## (undated) DEVICE — CONTAINER,SPECIMEN,PNEU TUBE,3OZ,OR STRL: Brand: MEDLINE

## (undated) DEVICE — 1010 S-DRAPE TOWEL DRAPE 10/BX: Brand: STERI-DRAPE™

## (undated) DEVICE — PEEL-AWAY HOOD: Brand: FLYTE, SURGICOOL

## (undated) DEVICE — SUTURE ETHBND EXCEL SZ 0 L18IN NONABSORBABLE GRN L36MM CT-1 CX21D

## (undated) DEVICE — SOLUTION IV IRRIG WATER 1000ML POUR BRL 2F7114

## (undated) DEVICE — COUNTER NDL 40 COUNT HLD 70 NUM FOAM BLK SGL MAG W BLDE REMV

## (undated) DEVICE — DRESSING THERABOND 3D ANTIMIC CNTCT SYS 15INCHX10INCH

## (undated) DEVICE — 3M™ STERI-DRAPE™ U-DRAPE 1015: Brand: STERI-DRAPE™

## (undated) DEVICE — HANDPIECE SUCTION TUBING INTERPULSE 10FT

## (undated) DEVICE — BIPOLAR SEALER 23-112-1 AQM 6.0: Brand: AQUAMANTYS ®

## (undated) DEVICE — DISPOSABLE ORTHOPAEDIC PROTECTOR: Brand: ALEXIS ® ORTHOPAEDIC PROTECTOR

## (undated) DEVICE — NEEDLE HYPO 22GA L1.5IN BLK POLYPR HUB S STL REG BVL STR

## (undated) DEVICE — JEWISH HOSPITAL TURNOVER KIT: Brand: MEDLINE INDUSTRIES, INC.

## (undated) DEVICE — YANKAUER,OPEN TIP,W/O VENT,STERILE: Brand: MEDLINE INDUSTRIES, INC.

## (undated) DEVICE — BLANKET WRM W29.9XL79.1IN UP BODY FORC AIR MISTRAL-AIR

## (undated) DEVICE — COAXIAL HIGH FLOW TIP WITH SOFT SHIELD

## (undated) DEVICE — SUTURE MCRYL SZ 4-0 L27IN ABSRB UD L19MM PS-2 1/2 CIR PRIM Y426H

## (undated) DEVICE — GLOVE SURG SZ 75 CRM LTX FREE POLYISOPRENE POLYMER BEAD ANTI

## (undated) DEVICE — DUAL CUT SAGITTAL BLADE

## (undated) DEVICE — 3M™ STERI-DRAPE™ INSTRUMENT POUCH 1018: Brand: STERI-DRAPE™

## (undated) DEVICE — C-ARM: Brand: UNBRANDED

## (undated) DEVICE — SUTURE STRATAFIX SPRL SZ 1 L14IN ABSRB VLT L48CM CTX 1/2 SXPD2B405

## (undated) DEVICE — SYSTEM SKIN CLSR 22CM DERMBND PRINEO

## (undated) DEVICE — BANDAGE COBAN 4 IN COMPR W4INXL5YD FOAM COHESIVE QUIK STK SELF ADH SFT